# Patient Record
Sex: MALE | Race: WHITE | NOT HISPANIC OR LATINO | Employment: OTHER | URBAN - METROPOLITAN AREA
[De-identification: names, ages, dates, MRNs, and addresses within clinical notes are randomized per-mention and may not be internally consistent; named-entity substitution may affect disease eponyms.]

---

## 2017-03-21 RX ORDER — PROPAFENONE HYDROCHLORIDE 225 MG/1
CAPSULE, EXTENDED RELEASE ORAL
Qty: 90 CAPSULE | Refills: 2 | Status: ON HOLD | OUTPATIENT
Start: 2017-03-21 | End: 2020-01-16

## 2017-03-21 RX ORDER — SIMVASTATIN 40 MG
TABLET ORAL
Qty: 90 TABLET | Refills: 2 | Status: SHIPPED | OUTPATIENT
Start: 2017-03-21

## 2017-03-21 RX ORDER — RAMIPRIL 10 MG/1
CAPSULE ORAL
Qty: 90 CAPSULE | Refills: 2 | Status: SHIPPED | OUTPATIENT
Start: 2017-03-21

## 2017-04-26 ENCOUNTER — OFFICE VISIT (OUTPATIENT)
Dept: CARDIOLOGY | Facility: CLINIC | Age: 82
End: 2017-04-26

## 2017-04-26 ENCOUNTER — HOSPITAL ENCOUNTER (OUTPATIENT)
Dept: CARDIOLOGY | Facility: HOSPITAL | Age: 82
Discharge: HOME | End: 2017-04-26
Admitting: INTERNAL MEDICINE

## 2017-04-26 VITALS
WEIGHT: 161 LBS | SYSTOLIC BLOOD PRESSURE: 130 MMHG | BODY MASS INDEX: 23.05 KG/M2 | HEIGHT: 70 IN | HEART RATE: 52 BPM | DIASTOLIC BLOOD PRESSURE: 61 MMHG

## 2017-04-26 DIAGNOSIS — R06.09 DYSPNEA ON EXERTION: ICD-10-CM

## 2017-04-26 DIAGNOSIS — I42.9 CARDIOMYOPATHY (HCC): ICD-10-CM

## 2017-04-26 DIAGNOSIS — R09.89 CAROTID BRUIT, UNSPECIFIED LATERALITY: ICD-10-CM

## 2017-04-26 DIAGNOSIS — I10 HTN (HYPERTENSION), BENIGN: ICD-10-CM

## 2017-04-26 DIAGNOSIS — I25.10 CORONARY ARTERIOSCLEROSIS IN NATIVE ARTERY: ICD-10-CM

## 2017-04-26 DIAGNOSIS — R53.82 CHRONIC FATIGUE: ICD-10-CM

## 2017-04-26 DIAGNOSIS — R94.31 ABNORMAL ELECTROCARDIOGRAM: ICD-10-CM

## 2017-04-26 DIAGNOSIS — R00.1 SINUS BRADYCARDIA: ICD-10-CM

## 2017-04-26 DIAGNOSIS — I25.10 CAD IN NATIVE ARTERY: Primary | ICD-10-CM

## 2017-04-26 LAB
ALBUMIN SERPL-MCNC: 3.9 G/DL (ref 3.5–5.2)
ALBUMIN/GLOB SERPL: 1.2 G/DL
ALP SERPL-CCNC: 53 U/L (ref 39–117)
ALT SERPL W P-5'-P-CCNC: 11 U/L (ref 1–41)
ANION GAP SERPL CALCULATED.3IONS-SCNC: 12.3 MMOL/L
AST SERPL-CCNC: 18 U/L (ref 1–40)
BILIRUB SERPL-MCNC: 0.7 MG/DL (ref 0.1–1.2)
BUN BLD-MCNC: 27 MG/DL (ref 8–23)
BUN/CREAT SERPL: 25 (ref 7–25)
CALCIUM SPEC-SCNC: 9.9 MG/DL (ref 8.2–9.6)
CHLORIDE SERPL-SCNC: 105 MMOL/L (ref 98–107)
CHOLEST SERPL-MCNC: 127 MG/DL (ref 0–200)
CK SERPL-CCNC: 88 U/L (ref 20–200)
CO2 SERPL-SCNC: 23.7 MMOL/L (ref 22–29)
CREAT BLD-MCNC: 1.08 MG/DL (ref 0.76–1.27)
D DIMER PPP FEU-MCNC: <0.27 MCGFEU/ML (ref 0–0.49)
ERYTHROCYTE [SEDIMENTATION RATE] IN BLOOD: 15 MM/HR (ref 0–20)
GFR SERPL CREATININE-BSD FRML MDRD: 64 ML/MIN/1.73
GLOBULIN UR ELPH-MCNC: 3.3 GM/DL
GLUCOSE BLD-MCNC: 83 MG/DL (ref 65–99)
HDLC SERPL-MCNC: 61 MG/DL (ref 40–60)
LDLC SERPL CALC-MCNC: 42 MG/DL (ref 0–100)
LDLC/HDLC SERPL: 0.7 {RATIO}
MAGNESIUM SERPL-MCNC: 2.8 MG/DL (ref 1.6–2.4)
NT-PROBNP SERPL-MCNC: 591.5 PG/ML (ref 0–1800)
POTASSIUM BLD-SCNC: 5 MMOL/L (ref 3.5–5.2)
PROT SERPL-MCNC: 7.2 G/DL (ref 6–8.5)
SODIUM BLD-SCNC: 141 MMOL/L (ref 136–145)
T3FREE SERPL-MCNC: 2.69 PG/ML (ref 2–4.4)
TRIGL SERPL-MCNC: 118 MG/DL (ref 0–150)
VLDLC SERPL-MCNC: 23.6 MG/DL (ref 5–40)

## 2017-04-26 PROCEDURE — 82550 ASSAY OF CK (CPK): CPT | Performed by: INTERNAL MEDICINE

## 2017-04-26 PROCEDURE — 99214 OFFICE O/P EST MOD 30 MIN: CPT | Performed by: INTERNAL MEDICINE

## 2017-04-26 PROCEDURE — 83880 ASSAY OF NATRIURETIC PEPTIDE: CPT | Performed by: INTERNAL MEDICINE

## 2017-04-26 PROCEDURE — 85379 FIBRIN DEGRADATION QUANT: CPT | Performed by: INTERNAL MEDICINE

## 2017-04-26 PROCEDURE — 85652 RBC SED RATE AUTOMATED: CPT | Performed by: INTERNAL MEDICINE

## 2017-04-26 PROCEDURE — 93000 ELECTROCARDIOGRAM COMPLETE: CPT | Performed by: INTERNAL MEDICINE

## 2017-04-26 PROCEDURE — 84481 FREE ASSAY (FT-3): CPT | Performed by: INTERNAL MEDICINE

## 2017-04-26 PROCEDURE — 36415 COLL VENOUS BLD VENIPUNCTURE: CPT | Performed by: INTERNAL MEDICINE

## 2017-04-26 PROCEDURE — 83735 ASSAY OF MAGNESIUM: CPT | Performed by: INTERNAL MEDICINE

## 2017-04-26 PROCEDURE — 80053 COMPREHEN METABOLIC PANEL: CPT | Performed by: INTERNAL MEDICINE

## 2017-04-26 PROCEDURE — 80061 LIPID PANEL: CPT | Performed by: INTERNAL MEDICINE

## 2017-04-26 NOTE — PROGRESS NOTES
[facility]  Cardiology Office Visit Note        Subjective:     Encounter Date:2017      Patient ID: Tony Garrett   Age: 89 y.o.  Sex: male  :  1926  MRN: [id number]             Date of Office Visit: 2017  Encounter Provider: Seven Cabello MD  Place of Service: [facility]     .    Chief Complaint:  History of Present Illness    The following portions of the patient's history were reviewed and updated as appropriate: allergies, current medications, past family history, past medical history, past social history, past surgical history and problem list.    Review of Systems   Constitution: Positive for weakness and malaise/fatigue. Negative for chills, fever and weight gain.   HENT: Negative for congestion, headaches, hearing loss and sore throat.    Eyes: Negative for blurred vision and double vision.   Cardiovascular: Positive for dyspnea on exertion. Negative for chest pain, claudication, cyanosis, irregular heartbeat, leg swelling, near-syncope, orthopnea, palpitations, paroxysmal nocturnal dyspnea and syncope.   Respiratory: Negative for cough, shortness of breath, snoring and wheezing.    Endocrine: Negative for cold intolerance.   Hematologic/Lymphatic: Negative for adenopathy. Does not bruise/bleed easily.   Skin: Negative for rash.   Musculoskeletal: Negative for back pain.   Gastrointestinal: Positive for nausea and vomiting. Negative for abdominal pain, change in bowel habit, constipation and diarrhea.   Genitourinary: Negative for dysuria, frequency, hematuria and hesitancy.   Neurological: Positive for numbness. Negative for disturbances in coordination, excessive daytime sleepiness, dizziness, focal weakness, light-headedness and loss of balance.        Feet   Psychiatric/Behavioral: Negative for depression and memory loss. The patient is not nervous/anxious.    Allergic/Immunologic: Negative for hives.         ECG 12  Lead  Date/Time: 4/26/2017 9:11 AM  Performed by: MICHELLE WELCH JR  Authorized by: MICHELLE WELCH JR   Comparison: compared with previous ECG   Similar to previous ECG  Rhythm: sinus bradycardia  BPM: 50  T flattening: all  Clinical impression: abnormal ECG and low voltage                       HPI       The patient is a 9-year-old white male, with history of bypass in 2003, history of paroxysmal atrial fibrillation on Rythmol, hypertension, hyperlipidemia presents for cardiac follow-up.  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  Since then, he denies chest pain.  No PND, orthopnea.  He has mild ankle edema, dependent, mild, chronic.  No palpitations dizziness or syncope.  No change in weight.    Cardiac risk factors: Distant history of tobacco abuse.  Positive for hyperlipidemia and hypertension.  No family history of early CAD.  No diabetes.          No past medical history on file.    No past surgical history on file.    Social History     Social History   • Marital status:      Spouse name: N/A   • Number of children: N/A   • Years of education: N/A     Occupational History   • Not on file.     Social History Main Topics   • Smoking status: Not on file   • Smokeless tobacco: Not on file   • Alcohol use Not on file   • Drug use: Not on file   • Sexual activity: Not on file     Other Topics Concern   • Not on file     Social History Narrative       No family history on file.        Scheduled Meds:  Current Outpatient Prescriptions on File Prior to Visit   Medication Sig Dispense Refill   • amLODIPine (NORVASC) 5 MG tablet TAKE 1 TABLET DAILY 90 tablet 3   • aspirin 325 MG tablet Take  by mouth daily.     • propafenone (RHTHYMOL) 150 MG tablet TAKE 1 TABLET AT NIGHT 90 tablet 2   • propafenone SR (RYTHMOL SR) 225 MG 12 hr capsule TAKE 1 CAPSULE IN THE MORNING 90 capsule 2   • ramipril (ALTACE) 10 MG capsule TAKE 1  "CAPSULE DAILY 90 capsule 2   • simvastatin (ZOCOR) 40 MG tablet TAKE 1 TABLET AT BEDTIME 90 tablet 2   • zolpidem (AMBIEN) 10 MG tablet        No current facility-administered medications on file prior to visit.        /61  Pulse 52  Ht 70\" (177.8 cm)  Wt 161 lb (73 kg)  BMI 23.1 kg/m2    Objective:     Physical Exam   Constitutional: He is oriented to person, place, and time. He appears well-developed and well-nourished. No distress.   HENT:   Head: Normocephalic and atraumatic.   Right Ear: External ear normal.   Left Ear: External ear normal.   Mouth/Throat: Oropharynx is clear and moist. No oropharyngeal exudate.   Eyes: Conjunctivae and EOM are normal. Pupils are equal, round, and reactive to light. No scleral icterus.   Neck: Normal range of motion. Neck supple. No JVD present. No tracheal deviation present. No thyromegaly present.   Cardiovascular: Normal rate, regular rhythm, S1 normal, S2 normal and intact distal pulses.  PMI is not displaced.  Exam reveals no gallop, no distant heart sounds, no friction rub and no decreased pulses.    Murmur heard.  2/6 systolic murmur at the left sternal border.   Pulmonary/Chest: Effort normal and breath sounds normal. No accessory muscle usage. No respiratory distress. He has no wheezes. He has no rales. He exhibits no tenderness.   Abdominal: Soft. Bowel sounds are normal. He exhibits no distension and no mass. There is no tenderness. There is no rebound and no guarding.   Musculoskeletal: Normal range of motion. He exhibits no edema, tenderness or deformity.   Lymphadenopathy:     He has no cervical adenopathy.   Neurological: He is alert and oriented to person, place, and time. He has normal reflexes. No cranial nerve deficit. Coordination normal.   Skin: Skin is dry. No rash noted. He is not diaphoretic. No erythema. No pallor.   Psychiatric: He has a normal mood and affect.             Lab Review:               Lab Review:         Lab Review     Lab " Results   Component Value Date    CHOL 118 10/24/2016     Lab Results   Component Value Date    HDL 62 (H) 10/24/2016    HDL 61 (H) 11/04/2015    HDL 53 03/11/2015     Lab Results   Component Value Date    LDL 42 11/04/2015    LDL 43 03/11/2015    LDL 52 09/09/2014     Lab Results   Component Value Date    TRIG 86 10/24/2016    TRIG 88 11/04/2015    TRIG 126 03/11/2015     No components found for: CHOLHDL  Lab Results   Component Value Date    GLUCOSE 71 10/24/2016    BUN 27 (H) 10/24/2016    CREATININE 1.18 10/24/2016    EGFRIFNONA 58 (L) 10/24/2016    BCR 22.9 10/24/2016    CO2 21.1 (L) 10/24/2016    CALCIUM 9.5 10/24/2016    ALBUMIN 4.00 10/24/2016    LABIL2 1.7 10/24/2016    AST 12 10/24/2016    ALT 9 10/24/2016     Lab Results   Component Value Date    GLUCOSE 71 10/24/2016    CALCIUM 9.5 10/24/2016     10/24/2016    K 4.4 10/24/2016    CO2 21.1 (L) 10/24/2016     10/24/2016    BUN 27 (H) 10/24/2016    CREATININE 1.18 10/24/2016    EGFRIFNONA 58 (L) 10/24/2016    BCR 22.9 10/24/2016    ANIONGAP 13.9 10/24/2016     Lab Results   Component Value Date    WBC 5.14 10/24/2016    HGB 12.1 (L) 10/24/2016    HCT 36.9 (L) 10/24/2016    MCV 95.1 10/24/2016     (L) 10/24/2016     Lab Results   Component Value Date    DDIMER <150 04/09/2014     Lab Results   Component Value Date    TSH 3.380 10/24/2016     Lab Results   Component Value Date    CKTOTAL 71 10/24/2016     No results found for: DIGOXIN  Lab Results   Component Value Date    CKTOTAL 71 10/24/2016     No results found for: INR, PROTIME  CrCl cannot be calculated (Patient has no serum creatinine result on file.).    Assessment:          Diagnosis Plan   1. CAD in native artery  ECG 12 Lead    CBC & Differential    CK    Comprehensive Metabolic Panel    D-dimer, Quantitative    Lipid Panel    Magnesium    proBNP    T3, Free    TSH    T4, free    Sedimentation Rate    Testosterone, Free, Total   2. HTN (hypertension), benign  ECG 12 Lead   3.  Cardiomyopathy  CBC & Differential    CK    Comprehensive Metabolic Panel    D-dimer, Quantitative    Lipid Panel    Magnesium    proBNP    T3, Free    TSH    T4, free    Sedimentation Rate    Testosterone, Free, Total   4. Carotid bruit, unspecified laterality     5. Coronary arteriosclerosis in native artery     6. Sinus bradycardia     7. Dyspnea on exertion  CBC & Differential    CK    Comprehensive Metabolic Panel    D-dimer, Quantitative    Lipid Panel    Magnesium    proBNP    T3, Free    TSH    T4, free    Sedimentation Rate    Testosterone, Free, Total   8. Abnormal electrocardiogram  CBC & Differential    CK    Comprehensive Metabolic Panel    D-dimer, Quantitative    Lipid Panel    Magnesium    proBNP    T3, Free    TSH    T4, free    Sedimentation Rate    Testosterone, Free, Total   9. Chronic fatigue  CBC & Differential    CK    Comprehensive Metabolic Panel    D-dimer, Quantitative    Lipid Panel    Magnesium    proBNP    T3, Free    TSH    T4, free    Sedimentation Rate    Testosterone, Free, Total          Assessment and Plan:    Tony was seen today for coronary artery disease and hypertension.    Diagnoses and all orders for this visit:    CAD in native artery  -     ECG 12 Lead  -     CBC & Differential  -     CK  -     Comprehensive Metabolic Panel  -     D-dimer, Quantitative  -     Lipid Panel  -     Magnesium  -     proBNP  -     T3, Free  -     TSH  -     T4, free  -     Sedimentation Rate  -     Testosterone, Free, Total    HTN (hypertension), benign  -     ECG 12 Lead    Cardiomyopathy  -     CBC & Differential  -     CK  -     Comprehensive Metabolic Panel  -     D-dimer, Quantitative  -     Lipid Panel  -     Magnesium  -     proBNP  -     T3, Free  -     TSH  -     T4, free  -     Sedimentation Rate  -     Testosterone, Free, Total    Carotid bruit, unspecified laterality    Coronary arteriosclerosis in native artery    Sinus bradycardia    Dyspnea on exertion  -     CBC &  Differential  -     CK  -     Comprehensive Metabolic Panel  -     D-dimer, Quantitative  -     Lipid Panel  -     Magnesium  -     proBNP  -     T3, Free  -     TSH  -     T4, free  -     Sedimentation Rate  -     Testosterone, Free, Total    Abnormal electrocardiogram  -     CBC & Differential  -     CK  -     Comprehensive Metabolic Panel  -     D-dimer, Quantitative  -     Lipid Panel  -     Magnesium  -     proBNP  -     T3, Free  -     TSH  -     T4, free  -     Sedimentation Rate  -     Testosterone, Free, Total    Chronic fatigue  -     CBC & Differential  -     CK  -     Comprehensive Metabolic Panel  -     D-dimer, Quantitative  -     Lipid Panel  -     Magnesium  -     proBNP  -     T3, Free  -     TSH  -     T4, free  -     Sedimentation Rate  -     Testosterone, Free, Total    The patient is complains of easy fatigability and generalized weakness.  I will have him undergo testosterone test.  I'll also get a CBC, potassium level, magnesium level etc. to evaluate his fatigue.        A total of 25 minutes was spent in the care of this patient, including at least 13 minutes face-to-face with the patient.    I not only counseled the patient today on the significant factors noted in the assessment and plan, but I also recommended that the patient reduce salt and saturated animal fat intake in diet, as well as to perform scheduled exercise on a regular basis.      Plan:                  04/26/2017  12:00 PM  MD Seven Hugo MD  4/26/2017, 12:00 PM    EMR Dragon/Transcription disclaimer:   Much of this encounter note is an electronic transcription/translation of spoken language to printed text. The electronic translation of spoken language may permit erroneous, or at times, nonsensical words or phrases to be inadvertently transcribed; Although I have reviewed the note for such errors, some may still exist.

## 2017-09-13 ENCOUNTER — TRANSCRIBE ORDERS (OUTPATIENT)
Dept: CARDIOLOGY | Facility: CLINIC | Age: 82
End: 2017-09-13

## 2017-09-13 DIAGNOSIS — R09.89 CAROTID BRUIT, UNSPECIFIED LATERALITY: Primary | ICD-10-CM

## 2017-09-15 ENCOUNTER — HOSPITAL ENCOUNTER (OUTPATIENT)
Dept: ULTRASOUND IMAGING | Facility: HOSPITAL | Age: 82
Discharge: HOME | End: 2017-09-15
Attending: INTERNAL MEDICINE | Admitting: INTERNAL MEDICINE

## 2017-09-15 ENCOUNTER — TRANSCRIBE ORDERS (OUTPATIENT)
Dept: ADMINISTRATIVE | Facility: HOSPITAL | Age: 82
End: 2017-09-15

## 2017-09-15 ENCOUNTER — LAB (OUTPATIENT)
Dept: LAB | Facility: HOSPITAL | Age: 82
End: 2017-09-15
Attending: INTERNAL MEDICINE

## 2017-09-15 DIAGNOSIS — R09.89 CAROTID BRUIT, UNSPECIFIED LATERALITY: ICD-10-CM

## 2017-09-15 DIAGNOSIS — I48.91 ATRIAL FIBRILLATION, UNSPECIFIED TYPE (HCC): ICD-10-CM

## 2017-09-15 DIAGNOSIS — I48.91 ATRIAL FIBRILLATION, UNSPECIFIED TYPE (HCC): Primary | ICD-10-CM

## 2017-09-15 DIAGNOSIS — R06.02 SHORTNESS OF BREATH: ICD-10-CM

## 2017-09-15 LAB
ALBUMIN SERPL-MCNC: 3.7 G/DL (ref 3.5–5.2)
ALBUMIN/GLOB SERPL: 1.4 G/DL
ALP SERPL-CCNC: 49 U/L (ref 40–129)
ALT SERPL W P-5'-P-CCNC: 8 U/L (ref 5–41)
ANION GAP SERPL CALCULATED.3IONS-SCNC: 10.4 MMOL/L
AST SERPL-CCNC: 14 U/L (ref 5–40)
BASOPHILS # BLD AUTO: 0.02 10*3/MM3 (ref 0–0.2)
BASOPHILS NFR BLD AUTO: 0.5 % (ref 0–2)
BILIRUB SERPL-MCNC: 0.8 MG/DL (ref 0.2–1.2)
BUN BLD-MCNC: 23 MG/DL (ref 8–23)
BUN/CREAT SERPL: 20.9 (ref 7–25)
CALCIUM SPEC-SCNC: 9.2 MG/DL (ref 8.2–9.6)
CHLORIDE SERPL-SCNC: 106 MMOL/L (ref 98–107)
CK SERPL-CCNC: 142 U/L (ref 36–170)
CO2 SERPL-SCNC: 23.6 MMOL/L (ref 22–29)
CREAT BLD-MCNC: 1.1 MG/DL (ref 0.76–1.27)
DEPRECATED RDW RBC AUTO: 45.2 FL (ref 37–54)
EOSINOPHIL # BLD AUTO: 0.16 10*3/MM3 (ref 0.1–0.3)
EOSINOPHIL NFR BLD AUTO: 4.1 % (ref 0–4)
ERYTHROCYTE [DISTWIDTH] IN BLOOD BY AUTOMATED COUNT: 13.3 % (ref 11.5–14.5)
GFR SERPL CREATININE-BSD FRML MDRD: 63 ML/MIN/1.73
GLOBULIN UR ELPH-MCNC: 2.7 GM/DL
GLUCOSE BLD-MCNC: 94 MG/DL (ref 65–99)
HCT VFR BLD AUTO: 36.2 % (ref 42–52)
HGB BLD-MCNC: 11.9 G/DL (ref 14–18)
IMM GRANULOCYTES # BLD: 0 10*3/MM3 (ref 0–0.03)
IMM GRANULOCYTES NFR BLD: 0 % (ref 0–0.5)
LYMPHOCYTES # BLD AUTO: 0.79 10*3/MM3 (ref 0.6–4.8)
LYMPHOCYTES NFR BLD AUTO: 20.2 % (ref 20–45)
MCH RBC QN AUTO: 30.4 PG (ref 27–31)
MCHC RBC AUTO-ENTMCNC: 32.9 G/DL (ref 31–37)
MCV RBC AUTO: 92.3 FL (ref 80–94)
MONOCYTES # BLD AUTO: 0.28 10*3/MM3 (ref 0–1)
MONOCYTES NFR BLD AUTO: 7.2 % (ref 3–8)
NEUTROPHILS # BLD AUTO: 2.66 10*3/MM3 (ref 1.5–8.3)
NEUTROPHILS NFR BLD AUTO: 68 % (ref 45–70)
NRBC BLD MANUAL-RTO: 0 /100 WBC (ref 0–0)
NT-PROBNP SERPL-MCNC: 964.8 PG/ML (ref 5–450)
PLATELET # BLD AUTO: 134 10*3/MM3 (ref 140–500)
PMV BLD AUTO: 9.4 FL (ref 7.4–10.4)
POTASSIUM BLD-SCNC: 4.5 MMOL/L (ref 3.5–5.2)
PROT SERPL-MCNC: 6.4 G/DL (ref 6–8.5)
RBC # BLD AUTO: 3.92 10*6/MM3 (ref 4.7–6.1)
SODIUM BLD-SCNC: 140 MMOL/L (ref 136–145)
T3FREE SERPL-MCNC: 2.62 PG/ML (ref 2–4.4)
T4 FREE SERPL-MCNC: 1.29 NG/DL (ref 0.93–1.7)
TROPONIN T SERPL-MCNC: <0.01 NG/ML (ref 0–0.03)
TSH SERPL DL<=0.05 MIU/L-ACNC: 2.33 MIU/ML (ref 0.27–4.2)
WBC NRBC COR # BLD: 3.91 10*3/MM3 (ref 4.8–10.8)

## 2017-09-15 PROCEDURE — 80053 COMPREHEN METABOLIC PANEL: CPT

## 2017-09-15 PROCEDURE — 84443 ASSAY THYROID STIM HORMONE: CPT

## 2017-09-15 PROCEDURE — 93880 EXTRACRANIAL BILAT STUDY: CPT

## 2017-09-15 PROCEDURE — 84484 ASSAY OF TROPONIN QUANT: CPT

## 2017-09-15 PROCEDURE — 36415 COLL VENOUS BLD VENIPUNCTURE: CPT

## 2017-09-15 PROCEDURE — 85025 COMPLETE CBC W/AUTO DIFF WBC: CPT

## 2017-09-15 PROCEDURE — 84439 ASSAY OF FREE THYROXINE: CPT

## 2017-09-15 PROCEDURE — 83880 ASSAY OF NATRIURETIC PEPTIDE: CPT

## 2017-09-15 PROCEDURE — 82550 ASSAY OF CK (CPK): CPT

## 2017-09-15 PROCEDURE — 84481 FREE ASSAY (FT-3): CPT

## 2017-09-18 RX ORDER — PROPAFENONE HYDROCHLORIDE 150 MG/1
TABLET, COATED ORAL
Qty: 90 TABLET | Refills: 2 | OUTPATIENT
Start: 2017-09-18

## 2020-01-16 ENCOUNTER — HOSPITAL ENCOUNTER (INPATIENT)
Facility: HOSPITAL | Age: 85
LOS: 9 days | Discharge: HOME | End: 2020-01-25
Attending: FAMILY MEDICINE | Admitting: FAMILY MEDICINE

## 2020-01-16 DIAGNOSIS — F51.04 CHRONIC INSOMNIA: Primary | ICD-10-CM

## 2020-01-16 PROBLEM — Z51.89 ENCOUNTER FOR REHABILITATION: Status: ACTIVE | Noted: 2020-01-16

## 2020-01-16 PROCEDURE — 87081 CULTURE SCREEN ONLY: CPT | Performed by: FAMILY MEDICINE

## 2020-01-16 RX ORDER — METOPROLOL SUCCINATE 25 MG/1
25 TABLET, EXTENDED RELEASE ORAL 2 TIMES DAILY
COMMUNITY

## 2020-01-16 RX ORDER — AMLODIPINE BESYLATE 5 MG/1
5 TABLET ORAL DAILY
Status: DISCONTINUED | OUTPATIENT
Start: 2020-01-16 | End: 2020-01-25 | Stop reason: HOSPADM

## 2020-01-16 RX ORDER — ACETAMINOPHEN 325 MG/1
650 TABLET ORAL EVERY 4 HOURS PRN
Status: DISCONTINUED | OUTPATIENT
Start: 2020-01-16 | End: 2020-01-25 | Stop reason: HOSPADM

## 2020-01-16 RX ORDER — BISACODYL 5 MG/1
5 TABLET, DELAYED RELEASE ORAL DAILY PRN
Status: DISCONTINUED | OUTPATIENT
Start: 2020-01-16 | End: 2020-01-25 | Stop reason: HOSPADM

## 2020-01-16 RX ORDER — METOPROLOL SUCCINATE 25 MG/1
25 TABLET, EXTENDED RELEASE ORAL 2 TIMES DAILY
Status: DISCONTINUED | OUTPATIENT
Start: 2020-01-16 | End: 2020-01-25 | Stop reason: HOSPADM

## 2020-01-16 RX ORDER — DOCUSATE SODIUM 100 MG/1
100 CAPSULE, LIQUID FILLED ORAL 2 TIMES DAILY PRN
Status: DISCONTINUED | OUTPATIENT
Start: 2020-01-16 | End: 2020-01-25 | Stop reason: HOSPADM

## 2020-01-16 RX ORDER — RAMIPRIL 2.5 MG/1
10 CAPSULE ORAL DAILY
Status: DISCONTINUED | OUTPATIENT
Start: 2020-01-16 | End: 2020-01-25 | Stop reason: HOSPADM

## 2020-01-16 RX ORDER — ONDANSETRON 2 MG/ML
4 INJECTION INTRAMUSCULAR; INTRAVENOUS EVERY 6 HOURS PRN
Status: DISCONTINUED | OUTPATIENT
Start: 2020-01-16 | End: 2020-01-25 | Stop reason: HOSPADM

## 2020-01-16 RX ORDER — SIMVASTATIN 40 MG
40 TABLET ORAL DAILY
Status: DISCONTINUED | OUTPATIENT
Start: 2020-01-16 | End: 2020-01-25 | Stop reason: HOSPADM

## 2020-01-16 RX ORDER — ZOLPIDEM TARTRATE 5 MG/1
5 TABLET ORAL NIGHTLY
Status: DISCONTINUED | OUTPATIENT
Start: 2020-01-16 | End: 2020-01-25 | Stop reason: HOSPADM

## 2020-01-16 RX ORDER — AMOXICILLIN 250 MG
2 CAPSULE ORAL 2 TIMES DAILY PRN
Status: DISCONTINUED | OUTPATIENT
Start: 2020-01-16 | End: 2020-01-25 | Stop reason: HOSPADM

## 2020-01-16 RX ORDER — ONDANSETRON 4 MG/1
4 TABLET, FILM COATED ORAL EVERY 6 HOURS PRN
Status: DISCONTINUED | OUTPATIENT
Start: 2020-01-16 | End: 2020-01-25 | Stop reason: HOSPADM

## 2020-01-16 RX ORDER — ZOLPIDEM TARTRATE 5 MG/1
10 TABLET ORAL NIGHTLY
Status: DISCONTINUED | OUTPATIENT
Start: 2020-01-16 | End: 2020-01-16

## 2020-01-16 RX ADMIN — METOPROLOL SUCCINATE 25 MG: 25 TABLET, EXTENDED RELEASE ORAL at 22:40

## 2020-01-16 RX ADMIN — DOCUSATE SODIUM 100 MG: 100 CAPSULE, LIQUID FILLED ORAL at 22:40

## 2020-01-16 RX ADMIN — TUBERCULIN PURIFIED PROTEIN DERIVATIVE 5 UNITS: 5 INJECTION INTRADERMAL at 23:08

## 2020-01-17 LAB — MRSA SPEC QL CULT: NORMAL

## 2020-01-17 PROCEDURE — 97161 PT EVAL LOW COMPLEX 20 MIN: CPT

## 2020-01-17 PROCEDURE — 97535 SELF CARE MNGMENT TRAINING: CPT

## 2020-01-17 PROCEDURE — 97165 OT EVAL LOW COMPLEX 30 MIN: CPT

## 2020-01-17 PROCEDURE — 97110 THERAPEUTIC EXERCISES: CPT

## 2020-01-17 RX ADMIN — AMLODIPINE BESYLATE 5 MG: 5 TABLET ORAL at 09:42

## 2020-01-17 RX ADMIN — RAMIPRIL 10 MG: 2.5 CAPSULE ORAL at 09:42

## 2020-01-17 RX ADMIN — METOPROLOL SUCCINATE 25 MG: 25 TABLET, EXTENDED RELEASE ORAL at 09:42

## 2020-01-17 RX ADMIN — ZOLPIDEM TARTRATE 5 MG: 5 TABLET ORAL at 00:09

## 2020-01-17 RX ADMIN — WHITE PETROLATUM 41 % TOPICAL OINTMENT: OINTMENT at 21:42

## 2020-01-17 RX ADMIN — METOPROLOL SUCCINATE 25 MG: 25 TABLET, EXTENDED RELEASE ORAL at 21:42

## 2020-01-17 RX ADMIN — ZOLPIDEM TARTRATE 5 MG: 5 TABLET ORAL at 21:45

## 2020-01-17 NOTE — THERAPY EVALUATION
SNF - Physical Therapy Initial Evaluation   [facility]     Patient Name: Tony Garrett  : 1926  MRN: [id number]  Today's Date: 2020   Onset of Illness/Injury or Date of Surgery: 20(Admitted to SNF on 20)  Date of Referral to PT: 20  Referring Physician: Dr. Corrales       Admit Date: 2020    Visit Dx: No diagnosis found.  Patient Active Problem List   Diagnosis   • Abnormal electrocardiogram   • Coronary arteriosclerosis in native artery   • Cardiomyopathy (CMS/HCC)   • Carotid bruit   • Dyspnea on exertion   • Sinus bradycardia   • Fatigue   • Encounter for rehabilitation     Past Medical History:   Diagnosis Date   • Atrial fibrillation, chronic    • Chronic kidney disease (CKD), stage III (moderate) (CMS/HCC)    • HTN (hypertension)    • Hyperlipidemia    • Kidney stones      Past Surgical History:   Procedure Laterality Date   • CYSTOSCOPY BLADDER STONE LITHOTRIPSY          PT ASSESSMENT (last 12 hours)      Physical Therapy Evaluation     Row Name 20 0900          PT Evaluation Time/Intention    Subjective Information  no complaints  -BP     Document Type  evaluation  -BP     Mode of Treatment  physical therapy  -BP     Patient Effort  adequate  -BP     Symptoms Noted During/After Treatment  none  -BP     Row Name 20 0900          General Information    Patient Profile Reviewed?  yes  -BP     Onset of Illness/Injury or Date of Surgery  20 Admitted to SNF on 20  -BP     Referring Physician  Dr. Corrales   -BP     Patient Observations  alert;cooperative;agree to therapy  -BP     Patient/Family Observations  Patient supine in bed with HOB elevated. Agreeable to PT evaluation.   -BP     Prior Level of Function  independent:;all household mobility;community mobility;gait;transfer;bed mobility;ADL's per patient report. Without an assistive device   -BP     Equipment Currently Used at Home  shower chair;raised toilet;grab bar owns a straight cane, does  "not use   -BP     Pertinent History of Current Functional Problem  Patient presented to hospital with complaints of flank pain. Patient underwent a uteroscopy with stone extraction and stent insertion.  Patient reports at baseline he does not use a device and is independent with all mobility and ADL's.  Patient reports at baseline he drags his L toe at times. He states \"it gets caught on the floor sometimes.\"   -BP     Existing Precautions/Restrictions  fall  -BP     Risks Reviewed  patient:;LOB  -BP     Benefits Reviewed  patient:;improve function;increase independence;increase strength  -BP     Barriers to Rehab  none identified  -BP     Row Name 01/17/20 0900          Relationship/Environment    Lives With  spouse  -BP     Row Name 01/17/20 0900          Resource/Environmental Concerns    Current Living Arrangements  home/apartment/condo  -BP     Row Name 01/17/20 0900          Living Environment    Living Arrangements  house  -BP     Home Accessibility  stairs to enter home  -BP     Living Environment Comment  Patient with ranch style home with walk out basement.   -BP     Row Name 01/17/20 0900          Home Main Entrance    Number of Stairs, Main Entrance  two  -BP     Stairs Comment, Main Entrance  one handrail, does not specify which side   -BP     Row Name 01/17/20 0900          Cognitive Assessment/Interventions    Additional Documentation  Cognitive Assessment/Intervention (Group)  -BP     Row Name 01/17/20 0900          Cognitive Assessment/Intervention- PT/OT    Orientation Status (Cognition)  oriented x 4  -BP     Follows Commands (Cognition)  WFL  -BP     Personal Safety Interventions  gait belt;nonskid shoes/slippers when out of bed  -BP     Row Name 01/17/20 0900          Safety Issues, Functional Mobility    Safety Issues Affecting Function (Mobility)  positioning of assistive device  -BP     Row Name 01/17/20 0900          Bed Mobility Assessment/Treatment    Bed Mobility Assessment/Treatment  " supine-sit  -BP     Supine-Sit [place] (Bed Mobility)  -- SBA   -BP     Assistive Device (Bed Mobility)  bed rails;head of bed elevated  -BP     Comment (Bed Mobility)  Patient requires extended time to complete transfer   -BP     Row Name 01/17/20 0900          Transfer Assessment/Treatment    Transfer Assessment/Treatment  sit-stand transfer;stand-sit transfer  -BP     Comment (Transfers)  Verbal cues for hand placement required.   -BP     Sit-Stand [place] (Transfers)  verbal cues;minimum assist (75% patient effort)  -BP     Stand-Sit [place] (Transfers)  contact guard;verbal cues  -BP     Row Name 01/17/20 0900          Sit-Stand Transfer    Assistive Device (Sit-Stand Transfers)  walker, front-wheeled  -BP     Row Name 01/17/20 0900          Stand-Sit Transfer    Assistive Device (Stand-Sit Transfers)  walker, front-wheeled  -BP     Row Name 01/17/20 0900          Gait/Stairs Assessment/Training    [place] Level (Gait)  contact guard;verbal cues  -     Assistive Device (Gait)  walker, front-wheeled  -BP     Distance in Feet (Gait)  20  -BP     Pattern (Gait)  swing-through  -BP     Deviations/Abnormal Patterns (Gait)  gait speed decreased;stride length decreased  -BP     Bilateral Gait Deviations  forward flexed posture  -BP     Left Sided Gait Deviations  foot drop/toe drag  -BP     Comment (Gait/Stairs)  Patient requires intermittent cues for positioning of device but does not require assist. No loss of balance noted.   -     Row Name 01/17/20 0900          General ROM    GENERAL ROM COMMENTS  B LE AROM WFL. L ankle DF limited greater than right DF.   -     Row Name 01/17/20 0900          MMT (Manual Muscle Testing)    General MMT Comments  B LE gross MMT 4+/5   -     Row Name 01/17/20 0900          Motor Assessment/Intervention    Additional Documentation  Therapeutic Exercise (Group)  -     Row Name 01/17/20 0900          Therapeutic Exercise    Therapeutic Exercise  seated,  lower extremities;supine, lower extremities  -BP     Additional Documentation  Therapeutic Exercise (Row)  -BP     Row Name 01/17/20 0900          Lower Extremity Seated Therapeutic Exercise    Performed, Seated Lower Extremity (Therapeutic Exercise)  hip flexion/extension;LAQ (long arc quad), knee extension  -BP     Comment, Seated Lower Extremity (Therapeutic Exercise)  Provided and reviewed written handout   -BP     Row Name 01/17/20 0900          Lower Extremity Supine Therapeutic Exercise    Performed, Supine Lower Extremity (Therapeutic Exercise)  hip abduction/adduction;SLR (straight leg raise);gluteal sets;ankle pumps;heel slides  -BP     Comment, Supine Lower Extremity (Therapeutic Exercise)  Provided and reviewed LE HEP   -BP     Row Name 01/17/20 0900          Sensory Assessment/Intervention    Sensory General Assessment  -- Patient denies numbness and tingling B LE's   -BP     Row Name 01/17/20 0900          Pain Assessment    Additional Documentation  Pain Scale: Numbers Pre/Post-Treatment (Group)  -BP     Row Name 01/17/20 0900          Pain Scale: Numbers Pre/Post-Treatment    Pain Scale: Numbers, Pretreatment  0/10 - no pain  -BP     Pain Scale: Numbers, Post-Treatment  0/10 - no pain  -BP     Pre/Post Treatment Pain Comment  Patient reports pain when urinating but denies pain at rest or with mobility  -BP     Row Name             Wound 01/16/20 2000 Right posterior elbow Abrasion    Wound - Properties Group Date first assessed: 01/16/20  -RS Time first assessed: 2000  -RS Side: Right  -RS Orientation: posterior  -RS Location: elbow  -RS Primary Wound Type: Abrasion  -RS    Row Name 01/17/20 0900          Plan of Care Review    Plan of Care Reviewed With  patient  -BP     Outcome Summary  PT Evaluation Complete: Patient performs supine to sit transfer with SBA, sit to/from stand transfers with min A, and gait x 20 feet with CGA with use of FWW. Patient demonstrates no loss of balance noted. Patient  would benefit from skilled PT services to address deficits in functional mobility and LE strength. Plan to see patient daily 5x/week x 1 week. Recommend home health PT at discharge. Will continue to assess equipment needs.   -BP     Row Name 01/17/20 0900          Physical Therapy Clinical Impression    Date of Referral to PT  01/16/20  -BP     PT Diagnosis (PT Clinical Impression)  Decreased functional mobility   -BP     Criteria for Skilled Interventions Met (PT Clinical Impression)  yes;treatment indicated  -BP     Rehab Potential (PT Clinical Summary)  good, to achieve stated therapy goals  -BP     Predicted Duration of Therapy (PT)  1 week   -BP     Care Plan Review (PT)  evaluation/treatment results reviewed;care plan/treatment goals reviewed;risks/benefits reviewed  -BP     Row Name 01/17/20 0900          Physical Therapy Goals    Bed Mobility Goal Selection (PT)  bed mobility, PT goal 1  -BP     Transfer Goal Selection (PT)  transfer, PT goal 1  -BP     Gait Training Goal Selection (PT)  gait training, PT goal 1  -BP     Stairs Goal Selection (PT)  stairs, PT goal 1  -BP     Additional Documentation  Stairs Goal Selection (PT) (Row)  -BP     Row Name 01/17/20 0900          Bed Mobility Goal 1 (PT)    Activity/Assistive Device (Bed Mobility Goal 1, PT)  bed mobility activities, all  -BP     [place] Level/Cues Needed (Bed Mobility Goal 1, PT)  supervision required  -BP     Time Frame (Bed Mobility Goal 1, PT)  1 week  -BP     Progress/Outcomes (Bed Mobility Goal 1, PT)  goal ongoing  -BP     Row Name 01/17/20 0900          Transfer Goal 1 (PT)    Activity/Assistive Device (Transfer Goal 1, PT)  sit-to-stand/stand-to-sit with appropriate assistive device   -BP     [place] Level/Cues Needed (Transfer Goal 1, PT)  supervision required  -BP     Time Frame (Transfer Goal 1, PT)  1 week  -BP     Progress/Outcome (Transfer Goal 1, PT)  goal ongoing  -BP     Row Name 01/17/20 0900          Gait Training Goal  1 (PT)    Activity/Assistive Device (Gait Training Goal 1, PT)  gait (walking locomotion);walker, rolling  -BP     [place] Level (Gait Training Goal 1, PT)  supervision required  -BP     Distance (Gait Goal 1, PT)  100  -BP     Time Frame (Gait Training Goal 1, PT)  1 week  -BP     Progress/Outcome (Gait Training Goal 1, PT)  goal ongoing  -BP     Row Name 01/17/20 0900          Stairs Goal 1 (PT)    Activity/Assistive Device (Stairs Goal 1, PT)  ascending stairs;descending stairs  -BP     [place] Level/Cues Needed (Stairs Goal 1, PT)  contact guard assist  -BP     Number of Stairs (Stairs Goal 1, PT)  2 with one handrail   -BP     Time Frame (Stairs Goal 1, PT)  1 week  -BP     Progress/Outcome (Stairs Goal 1, PT)  goal ongoing  -BP     Row Name 01/17/20 0900          Positioning and Restraints    Pre-Treatment Position  in bed  -BP     Post Treatment Position  chair  -BP     In Chair  notified nsg;reclined;call light within reach;encouraged to call for assist  -BP       User Key  (r) = Recorded By, (t) = Taken By, (c) = Cosigned By    Initials Name Provider Type    BP Jonnie Clark, PT Physical Therapist    RS Sindy Henson, RN Registered Nurse        Physical Therapy Education                 Title: PT OT SLP Therapies (Done)     Topic: Physical Therapy (Done)     Point: Mobility training (Done)     Description:   Instruct learner(s) on safety and technique for assisting patient out of bed, chair or wheelchair.  Instruct in the proper use of assistive devices, such as walker, crutches, cane or brace.              Patient Friendly Description:   It's important to get you on your feet again, but we need to do so in a way that is safe for you. Falling has serious consequences, and your personal safety is the most important thing of all.        When it's time to get out of bed, one of us or a family member will sit next to you on the bed to give you support.     If your doctor or nurse tells you to  use a walker, crutches, a cane, or a brace, be sure you use it every time you get out of bed, even if you think you don't need it.    Learning Progress Summary           Patient Acceptance, E,TB, VU by  at 1/17/2020 1126                   Point: Home exercise program (Done)     Description:   Instruct learner(s) on appropriate technique for monitoring, assisting and/or progressing patient with therapeutic exercises and activities.              Learning Progress Summary           Patient Acceptance, E,TB, VU by  at 1/17/2020 1126                               User Key     Initials Effective Dates Name Provider Type Discipline     04/03/18 -  Jonnie Clark, PT Physical Therapist PT              PT Recommendation and Plan  Anticipated Discharge Disposition (PT): home with home health  Planned Therapy Interventions (PT Eval): bed mobility training, gait training, home exercise program, patient/family education, transfer training, stair training, strengthening  Therapy Frequency (PT Clinical Impression): 5 times/wk  Outcome Summary/Treatment Plan (PT)  Anticipated Discharge Disposition (PT): home with home health  Plan of Care Reviewed With: patient  Outcome Summary: PT Evaluation Complete: Patient performs supine to sit transfer with SBA, sit to/from stand transfers with min A, and gait x 20 feet with CGA with use of FWW. Patient demonstrates no loss of balance noted. Patient would benefit from skilled PT services to address deficits in functional mobility and LE strength. Plan to see patient daily 5x/week x 1 week. Recommend home health PT at discharge. Will continue to assess equipment needs.      Time Calculation:   PT Charges     Row Name 01/17/20 1131             Time Calculation    Start Time  0900  -BP      Stop Time  0935  -BP      Time Calculation (min)  35 min  -BP      PT Non-Billable Time (min)  15 min eval minutes   -BP      PT Received On  01/17/20  -BP      PT - Next Appointment  01/20/20  -BP          Time Calculation- PT    TCU Minutes- PT  20 min 15 co treat with OT   -BP        User Key  (r) = Recorded By, (t) = Taken By, (c) = Cosigned By    Initials Name Provider Type    BP Jonnie Clark, PT Physical Therapist        Therapy Charges for Today     Code Description Service Date Service Provider Modifiers Qty    68863482368 HC PT EVAL LOW COMPLEXITY 1 1/17/2020 Jonnie Clark, PT GP 1    25711672973 HC PT THER PROC EA 15 MIN 1/17/2020 Jonnie Clark PT GP 1                 Jonnie Clark, PT  1/17/2020

## 2020-01-17 NOTE — PLAN OF CARE
Problem: Patient Care Overview  Goal: Plan of Care Review  Flowsheets (Taken 1/17/2020 0901)  Outcome Summary: OT evaluation completed. pt required SBA for supine to sit transfer to EOB. pt required min assist for functional transfers with RW. ANticipate pt will require min assist for lb adls however pt states he is not interested in long handled ae at this time. pt would benefit from skilled OT services to address adl retraining, functional transfers and balance. OT will see pt 5 x week x 1 week with anticipated discharge home with family.

## 2020-01-17 NOTE — H&P
HISTORY AND PHYSICAL      Patient Care Team:  Oralia Hammonds MD as PCP - General  Oralia Hammonds MD as PCP - Family Medicine    CHIEF COMPLAINT: Generalized weakness for rehabilitation    HISTORY OF PRESENT ILLNESS:    89-year-old white male well-known to me who presented to [facility] emergency room with severe right flank pain.  Patient was found to have a 1.8 mm distal ureteric stone at the UVJ associated with moderate hydroureteronephrosis patient underwent a stone extraction and stent insertion on 1 9.  Patient developed significant hematuria with clots requiring irrigation and a prolonged post operative state.  He had some blood loss anemia with hemoglobin went down to 9.8 but then remained stable he is being transferred here for generalized weakness and for rehabilitation.    Past Medical History:   Diagnosis Date   • Atrial fibrillation, chronic    • Chronic kidney disease (CKD), stage III (moderate) (CMS/HCC)    • HTN (hypertension)    • Hyperlipidemia    • Kidney stones      Past Surgical History:   Procedure Laterality Date   • CYSTOSCOPY BLADDER STONE LITHOTRIPSY       History reviewed. No pertinent family history.  Social History     Tobacco Use   • Smoking status: Former Smoker   Substance Use Topics   • Alcohol use: Not on file   • Drug use: Not on file     Medications Prior to Admission   Medication Sig Dispense Refill Last Dose   • metoprolol succinate XL (TOPROL-XL) 25 MG 24 hr tablet Take 25 mg by mouth 2 (Two) Times a Day.      • amLODIPine (NORVASC) 5 MG tablet TAKE 1 TABLET DAILY 90 tablet 3 Taking   • ramipril (ALTACE) 10 MG capsule TAKE 1 CAPSULE DAILY 90 capsule 2 Taking   • simvastatin (ZOCOR) 40 MG tablet TAKE 1 TABLET AT BEDTIME 90 tablet 2 Taking   • zolpidem (AMBIEN) 10 MG tablet    Taking     Allergies:  Patient has no known allergies.     Review of Systems   Constitutional: Positive for appetite change and fatigue. Negative for activity change.  "  HENT: Negative for congestion.    Respiratory: Negative for cough, chest tightness, shortness of breath and wheezing.    Cardiovascular: Negative for chest pain.   Gastrointestinal: Negative for abdominal distention, abdominal pain, diarrhea, nausea and vomiting.   Endocrine: Negative for polyphagia and polyuria.   Genitourinary: Negative for frequency.   Skin: Negative for rash.   Neurological: Positive for weakness. Negative for light-headedness.   Hematological: Does not bruise/bleed easily.   Psychiatric/Behavioral: Negative for agitation and behavioral problems.       Vital Signs  Temp:  [98.1 °F (36.7 °C)] 98.1 °F (36.7 °C)  Heart Rate:  [80] 80  Resp:  [16] 16  BP: (134)/(63) 134/63  Oxygen Therapy  SpO2: 96 %  Pulse Oximetry Type: Intermittent  Device (Oxygen Therapy): room air}  Body mass index is 25.36 kg/m².  Flowsheet Rows      First Filed Value   Admission Height  172.7 cm (68\") Documented at 01/16/2020 1656   Admission Weight  75.7 kg (166 lb 12.8 oz) Documented at 01/16/2020 1656                 Physical Exam   Constitutional: He appears well-developed and well-nourished.   HENT:   Head: Normocephalic.   Mouth/Throat: Oropharynx is clear and moist.   Eyes: Conjunctivae are normal.   Neck: Normal range of motion. No JVD present. No thyromegaly present.   Cardiovascular: Normal rate. An irregularly irregular rhythm present.   Murmur heard.   Systolic murmur is present with a grade of 2/6.  Pulmonary/Chest: Effort normal and breath sounds normal. No respiratory distress. He has no wheezes. He has no rales.   Abdominal: Soft. Bowel sounds are normal. He exhibits no distension. There is no tenderness. There is no guarding.   Neurological: He is alert.   Skin: Skin is warm and dry. No rash noted.   Nursing note and vitals reviewed.       Debilities/Disabilities Identified: None    Emotional Behavior: Appropriate    Results Review:    I reviewed the patient's new clinical results.  Lab Results (most " recent)     Procedure Component Value Units Date/Time    MRSA Screen Culture - Swab, Nares [[id number]] Collected:  01/16/20 1710    Specimen:  Swab from Nares Updated:  01/16/20 1715    VRE Culture - Swab, Per Rectum [[id number]] Collected:  01/16/20 1710    Specimen:  Swab from Per Rectum Updated:  01/16/20 1715          Imaging Results (Most Recent)     None        reviewed CT scan chest x-ray from outside facility    ECG/EMG Results (most recent)     None        reviewed outside EKG    Assessment/Plan       1.  Generalized weakness from prolonged recent hospital stay PT noted to be consulted and further recommendations once evaluated.    2.  Permanent atrial fibrillation rate controlled nothing acute continue with home beta-blocker.  Patient was on chronic anticoagulation with Eliquis to be held because of his significant hematuria will be resumed once this is improved    3.  Hypertension well controlled continue home medications    4.  Hyperlipidemia nothing acute we will continue with present statin      5.  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  Recent right ureteric stone status post stone extraction and stent placement stable follow-up with urology as recommended    7.  DVT prophylaxis SCD      I discussed the patients findings and my recommendations with patient      Oralia Hammonds MD  01/16/20  7:46 PM      Much of this encounter note is an electronic transcription/translation of spoken language to printed text using Dragon Software

## 2020-01-17 NOTE — PLAN OF CARE
Problem: Patient Care Overview  Goal: Plan of Care Review  Flowsheets (Taken 1/17/2020 0900)  Plan of Care Reviewed With: patient  Outcome Summary: PT Evaluation Complete: Patient performs supine to sit transfer with SBA, sit to/from stand transfers with min A, and gait x 20 feet with CGA with use of FWW. Patient demonstrates no loss of balance noted. Patient would benefit from skilled PT services to address deficits in functional mobility and LE strength. Plan to see patient daily 5x/week x 1 week. Recommend home health PT at discharge. Will continue to assess equipment needs.

## 2020-01-17 NOTE — THERAPY EVALUATION
SNF - Occupational Therapy Initial Evaluation   [facility]     Patient Name: Tony Garrett  : 1926  MRN: [id number]  Today's Date: 2020  Onset of Illness/Injury or Date of Surgery: 20  Date of Referral to OT: 20  Referring Physician: Cassius    Admit Date: 2020     No diagnosis found.  Patient Active Problem List   Diagnosis   • Abnormal electrocardiogram   • Coronary arteriosclerosis in native artery   • Cardiomyopathy (CMS/HCC)   • Carotid bruit   • Dyspnea on exertion   • Sinus bradycardia   • Fatigue   • Encounter for rehabilitation     Past Medical History:   Diagnosis Date   • Atrial fibrillation, chronic    • Chronic kidney disease (CKD), stage III (moderate) (CMS/HCC)    • HTN (hypertension)    • Hyperlipidemia    • Kidney stones      Past Surgical History:   Procedure Laterality Date   • CYSTOSCOPY BLADDER STONE LITHOTRIPSY            OT ASSESSMENT FLOWSHEET (last 12 hours)      Occupational Therapy Evaluation     Row Name 20 0901                   OT Evaluation Time/Intention    Subjective Information  no complaints  -JJ        Document Type  evaluation  -JJ        Mode of Treatment  occupational therapy  -JJ        Patient Effort  adequate  -JJ        Symptoms Noted During/After Treatment  none  -JJ           General Information    Patient Profile Reviewed?  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 pt underwent uteroscopy with stone extraction and  stent insertion. pt reports he is independent with adls and performs functional mobility without assistive device.   -JJ        Existing Precautions/Restrictions  fall  -JJ        Risks Reviewed  patient:;LOB  -JJ        Benefits Reviewed  patient:;improve function;increase independence  -JJ        Barriers to Rehab  none identified  -JJ           Relationship/Environment    Lives With  spouse  -JJ           Resource/Environmental Concerns    Current Living Arrangements  home/apartment/condo  -           Home Main Entrance    Number of Stairs, Main Entrance  two  -JJ        Stairs Comment, Main Entrance  one HR did not specify side  -JJ           Cognitive Assessment/Interventions    Additional Documentation  Cognitive Assessment/Intervention (Group)  -JJ           Cognitive Assessment/Intervention- PT/OT    Orientation Status (Cognition)  oriented x 4  -JJ        Follows Commands (Cognition)  WFL  -JJ        Personal Safety Interventions  gait belt;nonskid shoes/slippers when out of bed  -JJ           Safety Issues, Functional Mobility    Safety Issues Affecting Function (Mobility)  positioning of assistive device  -JJ           Bed Mobility Assessment/Treatment    Bed Mobility Assessment/Treatment  supine-sit  -JJ        Supine-Sit [place] (Bed Mobility)  -- SBA  -J        Assistive Device (Bed Mobility)  bed rails;head of bed elevated  -JJ        Comment (Bed Mobility)  pt requires extended time to transfer to EOB  -           Functional Mobility    Functional Mobility- Ind. Level  contact guard assist;verbal cues required  -        Functional Mobility- Device  rolling walker  -        Functional Mobility-Distance (Feet)  20  -JJ           Transfer Assessment/Treatment    Transfer Assessment/Treatment  sit-stand transfer;stand-sit transfer  -J        Comment (Transfers)  pt required verbal cues for hand placement  -           Sit-Stand Transfer    Sit-Stand [place] (Transfers)  minimum  assist (75% patient effort);verbal cues  -JJ        Assistive Device (Sit-Stand Transfers)  walker, front-wheeled  -JJ           Stand-Sit Transfer    Stand-Sit [place] (Transfers)  contact guard;verbal cues  -JJ        Assistive Device (Stand-Sit Transfers)  walker, front-wheeled  -JJ           Bathing Assessment/Intervention    Bathing [place] Level  minimum assist (75% patient effort);lower body;bathing skills;upper body  -JJ           Lower Body Dressing Assessment/Training    Lower Body Dressing [place] Level  lower body dressing skills;minimum assist (75% patient effort)  -JJ           General ROM    GENERAL ROM COMMENTS  B UE AROM WFL  -JJ           MMT (Manual Muscle Testing)    General MMT Comments  B UE MMT 4+/5  -JJ           Positioning and Restraints    Pre-Treatment Position  in bed  -JJ        Post Treatment Position  chair  -JJ        In Chair  notified nsg;call light within reach;encouraged to call for assist  -JJ           Pain Scale: Numbers Pre/Post-Treatment    Pain Scale: Numbers, Pretreatment  0/10 - no pain  -JJ        Pain Scale: Numbers, Post-Treatment  0/10 - no pain  -JJ           Wound 01/16/20 2000 Right posterior elbow Abrasion    Wound - Properties Group Date first assessed: 01/16/20  -RS Time first assessed: 2000  -RS Side: Right  -RS Orientation: posterior  -RS Location: elbow  -RS Primary Wound Type: Abrasion  -RS       Plan of Care Review    Plan of Care Reviewed With  patient  -JJ        Outcome Summary  OT evaluation completed. pt required SBA for supine to sit transfer to EOB. pt required min assist for functional transfers with RW. ANticipate pt will require min assist for lb adls however pt states he is not interested in long handled ae at this time. pt would benefit from skilled OT services to address adl retraining, functional transfers and balance. OT will see pt 5 x week x 1 week with anticipated discharge home with family.   -JJ           Clinical  Impression (OT)    Date of Referral to OT  01/17/20  -JJ        OT Diagnosis  decreased adl sp hospitalization  -JJ        Prognosis (OT Eval)  good  -J        Patient/Family Goals Statement (OT Eval)  pt states plan is to return home with family  -J        Criteria for Skilled Therapeutic Interventions Met (OT Eval)  yes;treatment indicated  -J        Rehab Potential (OT Eval)  good, to achieve stated therapy goals  -JJ        Therapy Frequency (OT Eval)  5 times/wk  -J        Predicted Duration of Therapy Intervention (Therapy Eval)  x 1 week  -JJ        Care Plan Review (OT)  evaluation/treatment results reviewed;care plan/treatment goals reviewed;risks/benefits reviewed  -J        Anticipated Discharge Disposition (OT)  home with assist;home with home health  -J           Planned OT Interventions    Planned Therapy Interventions (OT Eval)  BADL retraining;functional balance retraining;transfer/mobility retraining;patient/caregiver education/training  -JJ        BADL Retraining  x  -JJ        Functional Balance Retraining  x  -JJ        Patient/Caregiver Training  x  -JJ        Transfer/Mobility Retraining  x  -JJ           Transfer Goal 1 (OT)    Activity/Assistive Device (Transfer Goal 1, OT)  toilet;commode, 3-in-1;walker, rolling  -JJ        [place] Level/Cues Needed (Transfer Goal 1, OT)  supervision required  -JJ        Time Frame (Transfer Goal 1, OT)  1 week  -JJ        Progress/Outcome (Transfer Goal 1, OT)  -- new goal  -JJ           Bathing Goal 1 (OT)    Activity/Assistive Device (Bathing Goal 1, OT)  lower body bathing with long handled sponge if needed  -JJ        [place] Level/Cues Needed (Bathing Goal 1, OT)  supervision required  -JJ        Time Frame (Bathing Goal 1, OT)  1 week  -JJ        Progress/Outcomes (Bathing Goal 1, OT)  -- new goal  -J           Dressing Goal 1 (OT)    Activity/Assistive Device (Dressing Goal 1, OT)  lower body dressing with long handled ae if needed   -JJ        [place]/Cues Needed (Dressing Goal 1, OT)  supervision required  -JJ        Time Frame (Dressing Goal 1, OT)  1 week  -JJ        Progress/Outcome (Dressing Goal 1, OT)  -- new goal  -J           Balance Goal 1 (OT)    Activity/Assistive Device (Balance Goal 1, OT)  standing, dynamic;walker, rolling  -JJ        [place] Level/Cues Needed (Balance Goal 1, OT)  supervision required x 5 minutes to increase I with adls  -JJ        Time Frame (Balance Goal 1, OT)  1 week  -JJ        Progress/Outcomes (Balance Goal 1, OT)  -- new goal  -JJ           Living Environment    Home Accessibility  stairs to enter home  -JJ          User Key  (r) = Recorded By, (t) = Taken By, (c) = Cosigned By    Initials Name Effective Dates    Lisbet Lynn, OTR 06/22/16 -     RS Sindy Henson RN 04/28/17 -          Occupational Therapy Education                 Title: PT OT SLP Therapies (In Progress)     Topic: Occupational Therapy (In Progress)     Point: ADL training (Done)     Description:   Instruct learner(s) on proper safety adaptation and remediation techniques during self care or transfers.   Instruct in proper use of assistive devices.              Learning Progress Summary           Patient Acceptance, E,TB, VU by  at 1/17/2020 1328    Comment:  pt educated on adls, long handled ae and safety with functional transfers and mobility                               User Key     Initials Effective Dates Name Provider Type Discipline     06/22/16 -  Lisbet Le, OTR Occupational Therapist OT                  OT Recommendation and Plan  Outcome Summary/Treatment Plan (OT)  Anticipated Discharge Disposition (OT): home with assist, home with home health  Planned Therapy Interventions (OT Eval): BADL retraining, functional balance retraining, transfer/mobility retraining, patient/caregiver education/training  Therapy Frequency (OT Eval): 5 times/wk  Plan of Care Review  Plan of Care  Reviewed With: patient  Plan of Care Reviewed With: patient  Outcome Summary: OT evaluation completed. pt required SBA for supine to sit transfer to EOB. pt required min assist for functional transfers with RW. ANticipate pt will require min assist for lb adls however pt states he is not interested in long handled ae at this time. pt would benefit from skilled OT services to address adl retraining, functional transfers and balance. OT will see pt 5 x week x 1 week with anticipated discharge home with family.         Time Calculation:   Time Calculation- OT     Row Name 01/17/20 1335             Time Calculation- OT    OT Start Time  0900  -      OT Stop Time  0930  -      OT Time Calculation (min)  30 min  -      OT Non-Billable Time (min)  -- evaluation: 15 minutes treatment/cotreat with PT: 15 minutes  -        User Key  (r) = Recorded By, (t) = Taken By, (c) = Cosigned By    Initials Name Provider Type    iLsbet Lynn OTR Occupational Therapist        Therapy Charges for Today     Code Description Service Date Service Provider Modifiers Qty    53804460802  OT SELF CARE/MGMT/TRAIN EA 15 MIN 1/17/2020 Lisbet Le OTR GO 1    [id number]  OT EVAL LOW COMPLEXITY 1 1/17/2020 Lisbet Le OTR GO 1               [place]  1/17/2020

## 2020-01-17 NOTE — PROGRESS NOTES
"Daily Progress Note:      Chief complaint: Follow-up of right ureteric stone status post extraction with postoperative hematuria, atrial fibrillation, hypertension    Subjective: Still feels tired weak otherwise no new complaints   LOS: 1 day     Vital Signs  Temp:  [98.1 °F (36.7 °C)-98.4 °F (36.9 °C)] 98.4 °F (36.9 °C)  Heart Rate:  [76-80] 76  Resp:  [16-18] 18  BP: (134-144)/(63-67) 144/67  Oxygen Therapy  SpO2: 97 %  Pulse Oximetry Type: Intermittent  Device (Oxygen Therapy): room air}  Body mass index is 25.36 kg/m².  Flowsheet Rows      First Filed Value   Admission Height  172.7 cm (68\") Documented at 01/16/2020 1656   Admission Weight  75.7 kg (166 lb 12.8 oz) Documented at 01/16/2020 1656                   Documented weights    01/16/20 1656   Weight: 75.7 kg (166 lb 12.8 oz)           Patient Vitals for the past 24 hrs:   BP Temp Temp src Pulse Resp SpO2 Height Weight   01/16/20 2020 144/67 98.4 °F (36.9 °C) Oral 76 18 97 % -- --   01/16/20 1656 134/63 98.1 °F (36.7 °C) Oral 80 16 96 % 172.7 cm (68\") 75.7 kg (166 lb 12.8 oz)       75.7 kg (166 lb 12.8 oz)      Intake/Output Summary (Last 24 hours) at 1/17/2020 0758  Last data filed at 1/17/2020 0454  Gross per 24 hour   Intake 240 ml   Output 800 ml   Net -560 ml       Review of Systems   Constitutional: Positive for activity change and fatigue. Negative for appetite change.   HENT: Negative for congestion.    Respiratory: Negative for cough, chest tightness, shortness of breath and wheezing.    Cardiovascular: Negative for chest pain.   Gastrointestinal: Negative for abdominal distention, abdominal pain, diarrhea, nausea and vomiting.   Endocrine: Negative for polyphagia and polyuria.   Genitourinary: Negative for frequency.   Skin: Negative for rash.   Neurological: Negative for light-headedness.   Hematological: Does not bruise/bleed easily.   Psychiatric/Behavioral: Negative for agitation and behavioral problems.       Physical Exam   Constitutional: " He appears well-developed and well-nourished.   HENT:   Head: Normocephalic.   Mouth/Throat: Oropharynx is clear and moist.   Eyes: Conjunctivae are normal.   Neck: Normal range of motion. No JVD present. No thyromegaly present.   Cardiovascular: Normal rate. An irregularly irregular rhythm present.   Murmur heard.  Pulmonary/Chest: Effort normal and breath sounds normal. No respiratory distress. He has no wheezes. He has no rales.   Abdominal: Soft. Bowel sounds are normal. He exhibits no distension. There is no tenderness. There is no guarding.   Neurological: He is alert.   Skin: Skin is warm and dry. No rash noted.   Nursing note and vitals reviewed.      Medication Review:   I have reviewed the patient's current medication list  Scheduled Meds:  amLODIPine 5 mg Oral Daily   influenza virus vacc split PF 0.5 mL Intramuscular Once   metoprolol succinate XL 25 mg Oral BID   ramipril 10 mg Oral Daily   simvastatin 40 mg Oral Daily   zolpidem 5 mg Oral Nightly     Continuous Infusions:   PRN Meds:.•  acetaminophen  •  bisacodyl  •  docusate sodium  •  magnesium hydroxide  •  ondansetron **OR** ondansetron  •  senna-docusate sodium      Labs:  Results from last 7 days   Lab Units 01/16/20  0347 01/15/20  1311   WBC 10*3/uL 4.44*  --    HEMOGLOBIN g/dL 9.5* 9.2*   HEMATOCRIT % 29.5* 27.8*   PLATELETS 10*3/uL 147  --      Results from last 7 days   Lab Units 01/16/20  0347 01/15/20  1311   SODIUM mmol/L 137 139   POTASSIUM mmol/L 4.3 4.4   CHLORIDE mmol/L 110* 110*   TOTAL CO2 mmol/L 19* 20*   BUN mg/dL 37* 43*   CREATININE mg/dL 1.1 1.2   CALCIUM mg/dL 8.6 8.6           Lab Results (last 24 hours)     Procedure Component Value Units Date/Time    MRSA Screen Culture - Swab, Nares [[id number]] Collected:  01/16/20 1710    Specimen:  Swab from Nares Updated:  01/16/20 1715    VRE Culture - Swab, Per Rectum [[id number]] Collected:  01/16/20 1710    Specimen:  Swab from Per Rectum Updated:  01/16/20 1715                                   Invalid input(s): LDLCALC          No results found for: POCGLU                      Radiology:  Imaging Results (Last 24 Hours)     ** No results found for the last 24 hours. **          Cardiology:  ECG/EMG Results (last 24 hours)     ** No results found for the last 24 hours. **          I have reviewed recent labs results and consult notes.  Parts of this note may have been copied and pasted but patient was examined and interviewed by me today    Assessment and Plan:          1.  Generalized weakness from prolonged recent hospital stay PT noted to be consulted and further recommendations once evaluated.     2.  Permanent atrial fibrillation rate controlled nothing acute continue with home beta-blocker.  Patient was on chronic anticoagulation with Eliquis to be held because of his significant hematuria will be resumed once this is improved     3.  Hypertension well controlled continue home medications     4.  Hyperlipidemia nothing acute we will continue with present statin        5.  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  Recent right ureteric stone status post stone extraction and stent placement stable follow-up with urology as recommended     7.  DVT prophylaxis SCD     Much of this encounter note is an electronic transcription/translation of spoken language to printed text using Dragon Software

## 2020-01-18 LAB
ANION GAP SERPL CALCULATED.3IONS-SCNC: 9.5 MMOL/L (ref 5–15)
BUN BLD-MCNC: 23 MG/DL (ref 8–23)
BUN/CREAT SERPL: 24 (ref 7–25)
CALCIUM SPEC-SCNC: 8.6 MG/DL (ref 8.2–9.6)
CHLORIDE SERPL-SCNC: 104 MMOL/L (ref 98–107)
CO2 SERPL-SCNC: 23.5 MMOL/L (ref 22–29)
CREAT BLD-MCNC: 0.96 MG/DL (ref 0.76–1.27)
DEPRECATED RDW RBC AUTO: 45.7 FL (ref 37–54)
ERYTHROCYTE [DISTWIDTH] IN BLOOD BY AUTOMATED COUNT: 13.3 % (ref 12.3–15.4)
GFR SERPL CREATININE-BSD FRML MDRD: 73 ML/MIN/1.73
GLUCOSE BLD-MCNC: 99 MG/DL (ref 65–99)
HCT VFR BLD AUTO: 28.4 % (ref 37.5–51)
HEMOCCULT STL QL: POSITIVE
HGB BLD-MCNC: 8.9 G/DL (ref 13–17.7)
MCH RBC QN AUTO: 29.3 PG (ref 26.6–33)
MCHC RBC AUTO-ENTMCNC: 31.3 G/DL (ref 31.5–35.7)
MCV RBC AUTO: 93.4 FL (ref 79–97)
PLATELET # BLD AUTO: 177 10*3/MM3 (ref 140–450)
PMV BLD AUTO: 9.3 FL (ref 6–12)
POTASSIUM BLD-SCNC: 4.3 MMOL/L (ref 3.5–5.2)
RBC # BLD AUTO: 3.04 10*6/MM3 (ref 4.14–5.8)
SODIUM BLD-SCNC: 137 MMOL/L (ref 136–145)
VRE SPEC QL CULT: NORMAL
WBC NRBC COR # BLD: 6.64 10*3/MM3 (ref 3.4–10.8)

## 2020-01-18 PROCEDURE — 82272 OCCULT BLD FECES 1-3 TESTS: CPT | Performed by: FAMILY MEDICINE

## 2020-01-18 PROCEDURE — 85027 COMPLETE CBC AUTOMATED: CPT | Performed by: FAMILY MEDICINE

## 2020-01-18 PROCEDURE — 80048 BASIC METABOLIC PNL TOTAL CA: CPT | Performed by: FAMILY MEDICINE

## 2020-01-18 RX ORDER — PANTOPRAZOLE SODIUM 40 MG/1
40 TABLET, DELAYED RELEASE ORAL EVERY MORNING
Status: DISCONTINUED | OUTPATIENT
Start: 2020-01-19 | End: 2020-01-20

## 2020-01-18 RX ADMIN — WHITE PETROLATUM 41 % TOPICAL OINTMENT: OINTMENT at 22:58

## 2020-01-18 RX ADMIN — ZOLPIDEM TARTRATE 5 MG: 5 TABLET ORAL at 23:32

## 2020-01-18 RX ADMIN — WHITE PETROLATUM 41 % TOPICAL OINTMENT: OINTMENT at 09:36

## 2020-01-18 RX ADMIN — METOPROLOL SUCCINATE 25 MG: 25 TABLET, EXTENDED RELEASE ORAL at 22:56

## 2020-01-18 RX ADMIN — METOPROLOL SUCCINATE 25 MG: 25 TABLET, EXTENDED RELEASE ORAL at 09:32

## 2020-01-18 RX ADMIN — SIMVASTATIN 40 MG: 40 TABLET, FILM COATED ORAL at 22:57

## 2020-01-18 RX ADMIN — AMLODIPINE BESYLATE 5 MG: 5 TABLET ORAL at 09:32

## 2020-01-18 RX ADMIN — RAMIPRIL 10 MG: 2.5 CAPSULE ORAL at 09:31

## 2020-01-18 NOTE — PLAN OF CARE
Patient did not use call light. Patient attempted to go to bathroom and fell. Bed alarm was place. Post fall order set done, family and MD notified. Pt. Back in bed with no complaints of pain. Patient was educated on using call light to go to the bathroom.

## 2020-01-18 NOTE — NURSING NOTE
Patient did not use call light. Patient attempted to go to bathroom and fell hit head on rail of bed pt. Got a small knot on left side of forehead. Bed alarm was place. Post fall order set done, family and MD notified. Pt. Back in bed with no complaints of pain. Patient was educated on using call light to go to the bathroom.

## 2020-01-18 NOTE — PLAN OF CARE
Problem: Patient Care Overview  Goal: Plan of Care Review  Outcome: Ongoing (interventions implemented as appropriate)  Flowsheets  Taken 1/18/2020 1812  Progress: improving  Taken 1/18/2020 1010  Plan of Care Reviewed With: patient     Problem: Fall Risk (Adult)  Goal: Identify Related Risk Factors and Signs and Symptoms  Outcome: Ongoing (interventions implemented as appropriate)

## 2020-01-19 LAB
DEPRECATED RDW RBC AUTO: 45.6 FL (ref 37–54)
ERYTHROCYTE [DISTWIDTH] IN BLOOD BY AUTOMATED COUNT: 13.3 % (ref 12.3–15.4)
HCT VFR BLD AUTO: 27.8 % (ref 37.5–51)
HEMOCCULT STL QL: POSITIVE
HGB BLD-MCNC: 8.7 G/DL (ref 13–17.7)
MCH RBC QN AUTO: 29.3 PG (ref 26.6–33)
MCHC RBC AUTO-ENTMCNC: 31.3 G/DL (ref 31.5–35.7)
MCV RBC AUTO: 93.6 FL (ref 79–97)
PLATELET # BLD AUTO: 179 10*3/MM3 (ref 140–450)
PMV BLD AUTO: 9.6 FL (ref 6–12)
RBC # BLD AUTO: 2.97 10*6/MM3 (ref 4.14–5.8)
WBC NRBC COR # BLD: 6.26 10*3/MM3 (ref 3.4–10.8)

## 2020-01-19 PROCEDURE — 85027 COMPLETE CBC AUTOMATED: CPT | Performed by: FAMILY MEDICINE

## 2020-01-19 PROCEDURE — 82272 OCCULT BLD FECES 1-3 TESTS: CPT | Performed by: FAMILY MEDICINE

## 2020-01-19 RX ADMIN — METOPROLOL SUCCINATE 25 MG: 25 TABLET, EXTENDED RELEASE ORAL at 21:42

## 2020-01-19 RX ADMIN — PANTOPRAZOLE SODIUM 40 MG: 40 TABLET, DELAYED RELEASE ORAL at 06:32

## 2020-01-19 RX ADMIN — SIMVASTATIN 40 MG: 40 TABLET, FILM COATED ORAL at 21:44

## 2020-01-19 RX ADMIN — ZOLPIDEM TARTRATE 5 MG: 5 TABLET ORAL at 21:42

## 2020-01-19 RX ADMIN — RAMIPRIL 10 MG: 2.5 CAPSULE ORAL at 10:16

## 2020-01-19 RX ADMIN — WHITE PETROLATUM 41 % TOPICAL OINTMENT: OINTMENT at 10:17

## 2020-01-19 RX ADMIN — METOPROLOL SUCCINATE 25 MG: 25 TABLET, EXTENDED RELEASE ORAL at 10:16

## 2020-01-19 NOTE — PROGRESS NOTES
"Daily Progress Note:      Chief complaint: Follow-up of right ureteric stone status post extraction with postoperative hematuria, atrial fibrillation, hypertension    Subjective: Still feels tired weak complains of dark stools since yesterday morning patient forgot to tell me yesterday any abdominal pain nausea or vomiting     LOS: 2 days     Vital Signs  Temp:  [97.7 °F (36.5 °C)-98.2 °F (36.8 °C)] 98.2 °F (36.8 °C)  Heart Rate:  [68-85] 68  Resp:  [16-18] 18  BP: (117-126)/(56-60) 126/60  Oxygen Therapy  SpO2: 97 %  Pulse Oximetry Type: Intermittent  Device (Oxygen Therapy): room air}  Body mass index is 25.36 kg/m².  Flowsheet Rows      First Filed Value   Admission Height  172.7 cm (68\") Documented at 01/16/2020 1656   Admission Weight  75.7 kg (166 lb 12.8 oz) Documented at 01/16/2020 1656                   Documented weights    01/16/20 1656   Weight: 75.7 kg (166 lb 12.8 oz)           Patient Vitals for the past 24 hrs:   BP Temp Temp src Pulse Resp SpO2   01/18/20 1920 126/60 98.2 °F (36.8 °C) Oral 68 18 97 %   01/18/20 1009 117/57 97.7 °F (36.5 °C) Oral 73 18 97 %   01/18/20 0250 118/56 98.1 °F (36.7 °C) Oral 85 16 96 %       75.7 kg (166 lb 12.8 oz)      Intake/Output Summary (Last 24 hours) at 1/18/2020 2228  Last data filed at 1/18/2020 1920  Gross per 24 hour   Intake 720 ml   Output 875 ml   Net -155 ml       Review of Systems   Constitutional: Positive for activity change and fatigue. Negative for appetite change.   HENT: Negative for congestion.    Respiratory: Negative for cough, chest tightness, shortness of breath and wheezing.    Cardiovascular: Negative for chest pain.   Gastrointestinal: Negative for abdominal distention, abdominal pain, diarrhea, nausea and vomiting.   Endocrine: Negative for polyphagia and polyuria.   Genitourinary: Negative for frequency.   Skin: Negative for rash.   Neurological: Negative for light-headedness.   Hematological: Does not bruise/bleed easily. "   Psychiatric/Behavioral: Negative for agitation and behavioral problems.       Physical Exam   Constitutional: He appears well-developed and well-nourished.   HENT:   Head: Normocephalic.   Mouth/Throat: Oropharynx is clear and moist.   Eyes: Conjunctivae are normal.   Neck: Normal range of motion. No JVD present. No thyromegaly present.   Cardiovascular: Normal rate. An irregularly irregular rhythm present.   Murmur heard.  Pulmonary/Chest: Effort normal and breath sounds normal. No respiratory distress. He has no wheezes. He has no rales.   Abdominal: Soft. Bowel sounds are normal. He exhibits no distension. There is no tenderness. There is no guarding.   Genitourinary:   Genitourinary Comments: Rectal exam revealed dark stool on glove sent for Hemoccult   Neurological: He is alert.   Skin: Skin is warm and dry. No rash noted.   Nursing note and vitals reviewed.      Medication Review:   I have reviewed the patient's current medication list  Scheduled Meds:    amLODIPine 5 mg Oral Daily   AQUAPHOR ADVANCED THERAPY  Topical Q12H   influenza virus vacc split PF 0.5 mL Intramuscular Once   metoprolol succinate XL 25 mg Oral BID   ramipril 10 mg Oral Daily   simvastatin 40 mg Oral Daily   zolpidem 5 mg Oral Nightly     Continuous Infusions:   PRN Meds:.•  acetaminophen  •  bisacodyl  •  docusate sodium  •  magnesium hydroxide  •  ondansetron **OR** ondansetron  •  sennosides-docusate      Labs:  Results from last 7 days   Lab Units 01/16/20  0347 01/15/20  1311   WBC 10*3/uL 4.44*  --    HEMOGLOBIN g/dL 9.5* 9.2*   HEMATOCRIT % 29.5* 27.8*   PLATELETS 10*3/uL 147  --      Results from last 7 days   Lab Units 01/16/20  0347 01/15/20  1311   SODIUM mmol/L 137 139   POTASSIUM mmol/L 4.3 4.4   CHLORIDE mmol/L 110* 110*   TOTAL CO2 mmol/L 19* 20*   BUN mg/dL 37* 43*   CREATININE mg/dL 1.1 1.2   CALCIUM mg/dL 8.6 8.6           Lab Results (last 24 hours)     Procedure Component Value Units Date/Time    Occult Blood X 1,  Stool - Stool, Per Rectum [[id number]]  (Abnormal) Collected:  01/18/20 2149    Specimen:  Stool from Per Rectum Updated:  01/18/20 2156     Fecal Occult Blood Positive    VRE Culture - Swab, Per Rectum [[id number]]  (Normal) Collected:  01/16/20 1710    Specimen:  Swab from Per Rectum Updated:  01/18/20 1429     VRE SCREEN CX No Vancomycin Resistant Enterococcus Isolated    MRSA Screen Culture - Swab, Nares [[id number]]  (Normal) Collected:  01/16/20 1710    Specimen:  Swab from Nares Updated:  01/17/20 2235     MRSA SCREEN CX No Methicillin Resistant Staphylococcus aureus isolated                                  Invalid input(s): LDLCALC          No results found for: POCGLU      Results from last 7 days   Lab Units 01/16/20 1710   MRSA SCREEN CX  No Methicillin Resistant Staphylococcus aureus isolated                 Radiology:  Imaging Results (Last 24 Hours)     ** No results found for the last 24 hours. **          Cardiology:  ECG/EMG Results (last 24 hours)     ** No results found for the last 24 hours. **          I have reviewed recent labs results and consult notes.  Parts of this note may have been copied and pasted but patient was examined and interviewed by me today    Assessment and Plan:          1.  Generalized weakness from prolonged recent hospital stay PT noted to be consulted and further recommendations once evaluated.     2.  Permanent atrial fibrillation rate controlled nothing acute continue with home beta-blocker.  Patient was on chronic anticoagulation with Eliquis to be held because of his significant hematuria will be resumed once this is improved     3.  Hypertension well controlled continue home medications     4.  Hyperlipidemia nothing acute we will continue with present statin        5.  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  Recent right ureteric stone status post stone extraction and stent placement stable follow-up with urology as recommended     7.  Heme  positive stools we will check a CBC and BMP         Much of this encounter note is an electronic transcription/translation of spoken language to printed text using Dragon Software

## 2020-01-19 NOTE — PLAN OF CARE
Problem: Patient Care Overview  Goal: Plan of Care Review  Outcome: Ongoing (interventions implemented as appropriate)  Flowsheets  Taken 1/19/2020 0327  Progress: no change  Taken 1/19/2020 1014  Plan of Care Reviewed With: patient     Problem: Fall Risk (Adult)  Goal: Identify Related Risk Factors and Signs and Symptoms  Outcome: Ongoing (interventions implemented as appropriate)

## 2020-01-19 NOTE — PROGRESS NOTES
"Daily Progress Note:      Chief complaint: Follow-up of right ureteric stone status post extraction with postoperative hematuria, atrial fibrillation, hypertension    Subjective: Still feels tired weak complains of dark stools this morning but less so   LOS: 3 days     Vital Signs  Temp:  [97.4 °F (36.3 °C)-98.2 °F (36.8 °C)] 97.4 °F (36.3 °C)  Heart Rate:  [68-77] 77  Resp:  [16-18] 16  BP: (101-126)/(60-61) 101/61  Oxygen Therapy  SpO2: 97 %  Pulse Oximetry Type: Intermittent  Device (Oxygen Therapy): room air}  Body mass index is 25.36 kg/m².  Flowsheet Rows      First Filed Value   Admission Height  172.7 cm (68\") Documented at 01/16/2020 1656   Admission Weight  75.7 kg (166 lb 12.8 oz) Documented at 01/16/2020 1656                   Documented weights    01/16/20 1656   Weight: 75.7 kg (166 lb 12.8 oz)           Patient Vitals for the past 24 hrs:   BP Temp Temp src Pulse Resp SpO2   01/19/20 1014 101/61 97.4 °F (36.3 °C) Oral 77 16 97 %   01/18/20 1920 126/60 98.2 °F (36.8 °C) Oral 68 18 97 %       75.7 kg (166 lb 12.8 oz)      Intake/Output Summary (Last 24 hours) at 1/19/2020 1259  Last data filed at 1/19/2020 0800  Gross per 24 hour   Intake 240 ml   Output 500 ml   Net -260 ml       Review of Systems   Constitutional: Positive for activity change and fatigue. Negative for appetite change.   HENT: Negative for congestion.    Respiratory: Negative for cough, chest tightness, shortness of breath and wheezing.    Cardiovascular: Negative for chest pain.   Gastrointestinal: Negative for abdominal distention, abdominal pain, diarrhea, nausea and vomiting.   Endocrine: Negative for polyphagia and polyuria.   Genitourinary: Negative for frequency.   Skin: Negative for rash.   Neurological: Negative for light-headedness.   Hematological: Does not bruise/bleed easily.   Psychiatric/Behavioral: Negative for agitation and behavioral problems.       Physical Exam   Constitutional: He appears well-developed and " well-nourished.   HENT:   Head: Normocephalic.   Mouth/Throat: Oropharynx is clear and moist.   Eyes: Conjunctivae are normal.   Neck: Normal range of motion. No JVD present. No thyromegaly present.   Cardiovascular: Normal rate. An irregularly irregular rhythm present.   Murmur heard.  Pulmonary/Chest: Effort normal and breath sounds normal. No respiratory distress. He has no wheezes. He has no rales.   Abdominal: Soft. Bowel sounds are normal. He exhibits no distension. There is no tenderness. There is no guarding.   Neurological: He is alert.   Skin: Skin is warm and dry. No rash noted.   Nursing note and vitals reviewed.      Medication Review:   I have reviewed the patient's current medication list  Scheduled Meds:    amLODIPine 5 mg Oral Daily   AQUAPHOR ADVANCED THERAPY  Topical Q12H   influenza virus vacc split PF 0.5 mL Intramuscular Once   metoprolol succinate XL 25 mg Oral BID   pantoprazole 40 mg Oral QAM   ramipril 10 mg Oral Daily   simvastatin 40 mg Oral Daily   zolpidem 5 mg Oral Nightly     Continuous Infusions:   PRN Meds:.•  acetaminophen  •  bisacodyl  •  docusate sodium  •  magnesium hydroxide  •  ondansetron **OR** ondansetron  •  sennosides-docusate      Labs:  Results from last 7 days   Lab Units 01/19/20  0809 01/18/20  2311 01/16/20  0347 01/15/20  1311   WBC 10*3/mm3 6.26 6.64 4.44*  --    HEMOGLOBIN g/dL 8.7* 8.9* 9.5* 9.2*   HEMATOCRIT % 27.8* 28.4* 29.5* 27.8*   PLATELETS 10*3/mm3 179 177 147  --      Results from last 7 days   Lab Units 01/18/20  2311 01/16/20  0347 01/15/20  1311   SODIUM mmol/L 137 137 139   POTASSIUM mmol/L 4.3 4.3 4.4   CHLORIDE mmol/L 104 110* 110*   TOTAL CO2 mmol/L  --  19* 20*   CO2 mmol/L 23.5  --   --    BUN mg/dL 23 37* 43*   CREATININE mg/dL 0.96 1.1 1.2   CALCIUM mg/dL 8.6 8.6 8.6   GLUCOSE mg/dL 99  --   --            Lab Results (last 24 hours)     Procedure Component Value Units Date/Time    CBC (No Diff) [[id number]]  (Abnormal) Collected:  01/19/20  0809    Specimen:  Blood Updated:  01/19/20 0831     WBC 6.26 10*3/mm3      RBC 2.97 10*6/mm3      Hemoglobin 8.7 g/dL      Hematocrit 27.8 %      MCV 93.6 fL      MCH 29.3 pg      MCHC 31.3 g/dL      RDW 13.3 %      RDW-SD 45.6 fl      MPV 9.6 fL      Platelets 179 10*3/mm3     Basic Metabolic Panel [[id number]]  (Normal) Collected:  01/18/20 2311    Specimen:  Blood Updated:  01/18/20 2336     Glucose 99 mg/dL      BUN 23 mg/dL      Creatinine 0.96 mg/dL      Sodium 137 mmol/L      Potassium 4.3 mmol/L      Chloride 104 mmol/L      CO2 23.5 mmol/L      Calcium 8.6 mg/dL      eGFR Non African Amer 73 mL/min/1.73      BUN/Creatinine Ratio 24.0     Anion Gap 9.5 mmol/L     Narrative:       GFR Normal >60  Chronic Kidney Disease <60  Kidney Failure <15      CBC (No Diff) [[id number]]  (Abnormal) Collected:  01/18/20 2311    Specimen:  Blood Updated:  01/18/20 2318     WBC 6.64 10*3/mm3      RBC 3.04 10*6/mm3      Hemoglobin 8.9 g/dL      Hematocrit 28.4 %      MCV 93.4 fL      MCH 29.3 pg      MCHC 31.3 g/dL      RDW 13.3 %      RDW-SD 45.7 fl      MPV 9.3 fL      Platelets 177 10*3/mm3     Occult Blood X 1, Stool - Stool, Per Rectum [[id number]]  (Abnormal) Collected:  01/18/20 2149    Specimen:  Stool from Per Rectum Updated:  01/18/20 2156     Fecal Occult Blood Positive    VRE Culture - Swab, Per Rectum [[id number]]  (Normal) Collected:  01/16/20 1710    Specimen:  Swab from Per Rectum Updated:  01/18/20 1429     VRE SCREEN CX No Vancomycin Resistant Enterococcus Isolated                                  Invalid input(s): LDLCALC          No results found for: POCGLU      Results from last 7 days   Lab Units 01/16/20  1710   MRSA SCREEN CX  No Methicillin Resistant Staphylococcus aureus isolated                 Radiology:  Imaging Results (Last 24 Hours)     ** No results found for the last 24 hours. **          Cardiology:  ECG/EMG Results (last 24 hours)     ** No results found for the last 24 hours. **          I  have reviewed recent labs results and consult notes.  Parts of this note may have been copied and pasted but patient was examined and interviewed by me today    Assessment and Plan:          1.  Generalized weakness from prolonged recent hospital stay PT noted to be consulted and further recommendations once evaluated.     2.  Permanent atrial fibrillation rate controlled nothing acute continue with home beta-blocker.  Patient was on chronic anticoagulation with Eliquis to be held because of his significant hematuria will be resumed once this is improved     3.  Hypertension well controlled continue home medications     4.  Hyperlipidemia nothing acute we will continue with present statin        5.  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  Recent right ureteric stone status post stone extraction and stent placement stable follow-up with urology as recommended     7.  Heme positive stools H&H is slightly decreased but not significant we will get GI to evaluate the patient PPI ordered empirically       Much of this encounter note is an electronic transcription/translation of spoken language to printed text using Dragon Software

## 2020-01-20 LAB
ANION GAP SERPL CALCULATED.3IONS-SCNC: 7.5 MMOL/L (ref 5–15)
BACTERIA UR QL AUTO: ABNORMAL /HPF
BILIRUB UR QL STRIP: NEGATIVE
BUN BLD-MCNC: 20 MG/DL (ref 8–23)
BUN/CREAT SERPL: 21.3 (ref 7–25)
CALCIUM SPEC-SCNC: 9 MG/DL (ref 8.2–9.6)
CHLORIDE SERPL-SCNC: 106 MMOL/L (ref 98–107)
CLARITY UR: ABNORMAL
CO2 SERPL-SCNC: 24.5 MMOL/L (ref 22–29)
COLOR UR: ABNORMAL
CREAT BLD-MCNC: 0.94 MG/DL (ref 0.76–1.27)
DEPRECATED RDW RBC AUTO: 44.8 FL (ref 37–54)
ERYTHROCYTE [DISTWIDTH] IN BLOOD BY AUTOMATED COUNT: 13.3 % (ref 12.3–15.4)
GFR SERPL CREATININE-BSD FRML MDRD: 75 ML/MIN/1.73
GLUCOSE BLD-MCNC: 94 MG/DL (ref 65–99)
GLUCOSE UR STRIP-MCNC: NEGATIVE MG/DL
HCT VFR BLD AUTO: 26.2 % (ref 37.5–51)
HGB BLD-MCNC: 8.5 G/DL (ref 13–17.7)
HGB UR QL STRIP.AUTO: ABNORMAL
HYALINE CASTS UR QL AUTO: ABNORMAL /LPF
KETONES UR QL STRIP: NEGATIVE
LEUKOCYTE ESTERASE UR QL STRIP.AUTO: NEGATIVE
MCH RBC QN AUTO: 30 PG (ref 26.6–33)
MCHC RBC AUTO-ENTMCNC: 32.4 G/DL (ref 31.5–35.7)
MCV RBC AUTO: 92.6 FL (ref 79–97)
NITRITE UR QL STRIP: NEGATIVE
PH UR STRIP.AUTO: <=5 [PH] (ref 4.5–8)
PLATELET # BLD AUTO: 168 10*3/MM3 (ref 140–450)
PMV BLD AUTO: 9.3 FL (ref 6–12)
POTASSIUM BLD-SCNC: 4.1 MMOL/L (ref 3.5–5.2)
PROT UR QL STRIP: ABNORMAL
RBC # BLD AUTO: 2.83 10*6/MM3 (ref 4.14–5.8)
RBC # UR: ABNORMAL /HPF
REF LAB TEST METHOD: ABNORMAL
SODIUM BLD-SCNC: 138 MMOL/L (ref 136–145)
SP GR UR STRIP: 1.02 (ref 1–1.03)
SQUAMOUS #/AREA URNS HPF: ABNORMAL /HPF
UROBILINOGEN UR QL STRIP: ABNORMAL
WBC NRBC COR # BLD: 6.02 10*3/MM3 (ref 3.4–10.8)
WBC UR QL AUTO: ABNORMAL /HPF

## 2020-01-20 PROCEDURE — 97535 SELF CARE MNGMENT TRAINING: CPT

## 2020-01-20 PROCEDURE — 81001 URINALYSIS AUTO W/SCOPE: CPT | Performed by: UROLOGY

## 2020-01-20 PROCEDURE — 85027 COMPLETE CBC AUTOMATED: CPT | Performed by: FAMILY MEDICINE

## 2020-01-20 PROCEDURE — 99221 1ST HOSP IP/OBS SF/LOW 40: CPT | Performed by: INTERNAL MEDICINE

## 2020-01-20 PROCEDURE — 97116 GAIT TRAINING THERAPY: CPT

## 2020-01-20 PROCEDURE — 80048 BASIC METABOLIC PNL TOTAL CA: CPT | Performed by: FAMILY MEDICINE

## 2020-01-20 RX ORDER — PANTOPRAZOLE SODIUM 40 MG/1
40 TABLET, DELAYED RELEASE ORAL
Status: DISCONTINUED | OUTPATIENT
Start: 2020-01-20 | End: 2020-01-25 | Stop reason: HOSPADM

## 2020-01-20 RX ORDER — MAGNESIUM HYDROXIDE 1200 MG/15ML
LIQUID ORAL
Status: COMPLETED
Start: 2020-01-20 | End: 2020-01-20

## 2020-01-20 RX ADMIN — WHITE PETROLATUM 41 % TOPICAL OINTMENT: OINTMENT at 10:02

## 2020-01-20 RX ADMIN — RAMIPRIL 10 MG: 2.5 CAPSULE ORAL at 10:00

## 2020-01-20 RX ADMIN — ZOLPIDEM TARTRATE 5 MG: 5 TABLET ORAL at 22:51

## 2020-01-20 RX ADMIN — WATER: 1 IRRIGANT IRRIGATION at 13:35

## 2020-01-20 RX ADMIN — PANTOPRAZOLE SODIUM 40 MG: 40 TABLET, DELAYED RELEASE ORAL at 18:04

## 2020-01-20 RX ADMIN — AMLODIPINE BESYLATE 5 MG: 5 TABLET ORAL at 10:00

## 2020-01-20 RX ADMIN — SIMVASTATIN 40 MG: 40 TABLET, FILM COATED ORAL at 22:52

## 2020-01-20 RX ADMIN — WHITE PETROLATUM 41 % TOPICAL OINTMENT: OINTMENT at 22:57

## 2020-01-20 RX ADMIN — PANTOPRAZOLE SODIUM 40 MG: 40 TABLET, DELAYED RELEASE ORAL at 12:01

## 2020-01-20 RX ADMIN — METOPROLOL SUCCINATE 25 MG: 25 TABLET, EXTENDED RELEASE ORAL at 22:51

## 2020-01-20 RX ADMIN — ACETAMINOPHEN 650 MG: 325 TABLET, FILM COATED ORAL at 09:34

## 2020-01-20 RX ADMIN — METOPROLOL SUCCINATE 25 MG: 25 TABLET, EXTENDED RELEASE ORAL at 10:00

## 2020-01-20 NOTE — PLAN OF CARE
"  Problem: Patient Care Overview  Goal: Plan of Care Review  Note:   PT: Patient requires encouragement to participate.  Patient states \"I have already been up earlier today.\"  Encouragement required for continued mobility throughout the day.  Patient performs bed mobility with supervision and sit to/from stand with CGA.  Patient performs gait x102 feet with CGA/min assist, requiring increased assistance to correct balance loss during direction change.  Patient refuses ambulation in hallway during session and requires encouragement for participation in progressive activities.     "

## 2020-01-20 NOTE — PLAN OF CARE
Problem: Patient Care Overview  Goal: Plan of Care Review  Flowsheets  Taken 1/20/2020 1437 by Clemente Martin OT Student  Progress: improving (Pended)  Outcome Summary: OT: Pt performed transfers and mobility with CGA;however pt has a history of BLE neuropathy which increases his falls risk. Pt able to don/doff sock/slippers on R LE independently, yet he required min assist for L LE due to chronic limited ROM and c/o pain in left knee. Pt reports he has support at home if needed for adl's, yet he was receptive to further education with compensatory strategies/use of adaptive equipment to allow for increased adl independence. (Pended)  Taken 1/20/2020 1209 by Sandra Maher, RN  Plan of Care Reviewed With: patient

## 2020-01-20 NOTE — PROGRESS NOTES
"Adult Nutrition  Assessment/PES    Patient Name:  Tony Garrett  YOB: 1926  MRN: [id number]  Admit Date:  1/16/2020    Assessment Date:  1/20/2020    Comments:  Po intake 67% of meals.   Recommend: d/c cardiac restriction, pt reports Regular diet at home and may also help him eat more.  Declined oral supplement needs.     Reason for Assessment     Row Name 01/20/20 1356          Reason for Assessment    Reason For Assessment  per organizational policy admission assessment     Diagnosis  other (see comments) rehab for weakness s/p stone/stent/clots, + heme stool, afib         Nutrition/Diet History     Row Name 01/20/20 1358          Nutrition/Diet History    Typical Food/Fluid Intake  Spoke w pt at bedside. NKFA. Denies issue chew/swallowing. Fair appetite, due to recent hospitalization he reports. Wants to have regular diet so he can have salt. Declines CIB/Boost.          Anthropometrics     Row Name 01/20/20 1354          Anthropometrics    Height Method  measured reported standing ht on admit, armspan also completed      Height  174 cm (68.5\")     Weight  -- 166.8#        Ideal Body Weight (IBW)    Ideal Body Weight (IBW) (kg)  72.29        Usual Body Weight (UBW)    Usual Body Weight  -- 160's, 165# 11/8/19     Weight Loss Time Frame  no 30 day wt, no 6 month wt         Body Mass Index (BMI)    BMI Assessment  BMI 25-29.9: overweight         Labs/Tests/Procedures/Meds     Row Name 01/20/20 1400          Labs/Procedures/Meds    Lab Results Reviewed  reviewed     Lab Results Comments  H/H 8.5/26.2         Diagnostic Tests/Procedures    Diagnostic Test/Procedure Reviewed  reviewed     Diagnostic Test/Procedures Comments  +fecal blood        Medications    Pertinent Medications Reviewed  reviewed           Estimated/Assessed Needs     Row Name 01/20/20 1401 01/20/20 1359       Calculation Measurements    Height  --  174 cm (68.5\")       Estimated/Assessed Needs    Additional Documentation  Calorie " Requirements (Group);Protein Requirements (Group);Fluid Requirements (Group)  --       Calorie Requirements    Estimated Calorie Need Method  [place]  --    Estimated Calorie Requirement Comment  1651 kcal ( mifflin 1. 2)   --       Protein Requirements    Est Protein Requirement Amount (gms/kg)  1.0 gm protein 76 gm pro  --       Fluid Requirements    Estimated Fluid Requirement Method  RDA Method 1651 ml   --        Nutrition Prescription Ordered     Row Name 01/20/20 1402          Nutrition Prescription PO    Common Modifiers  Cardiac         Evaluation of Received Nutrient/Fluid Intake     Row Name 01/20/20 1402          Fluid Intake Evaluation    Oral Fluid (mL)  600 ave x 3, 36%        PO Evaluation    Number of Meals  9     % PO Intake  67               Problem/Interventions:  Problem 1     Row Name 01/20/20 1403          Nutrition Diagnoses Problem 1    Problem 1  Nutrition Appropriate for Condition at this Time               Intervention Goal     Row Name 01/20/20 1404          Intervention Goal    General  Meet nutritional needs for age/condition     PO  Maintain intake;PO intake (%)     PO Intake %  70 % or greater     Weight  Maintain weight         Nutrition Intervention     Row Name 01/20/20 1406          Nutrition Intervention    RD/Tech Action  Interview for preference;Encourage intake;Recommend/ordered;Supplement offered/refused;Follow Tx progress     Recommended/Ordered  Supplement           Education/Evaluation     Row Name 01/20/20 1400          Education    Education  No discharge needs identified at this time        Monitor/Evaluation    Monitor  Per protocol;I&O;PO intake;Pertinent labs;Weight;Symptoms           Electronically signed by:  Anay Danielle RD  01/20/20 2:05 PM

## 2020-01-20 NOTE — CONSULTS
[facility]              Initial Inpatient Consult Note  CC: kidney stone, blood in urine and lower abdominal pain  Referring Provider: Dr. Hammonds  Reason for Consultation: possible melena    Subjective     History of present illness: 89-year-old gentleman with past medical history significant for atrial fibrillation on chronic anticoagulation presented to [facility] with lower abdominal pain and flank pain was found to have a UVJ stone with associated hydro-nephrosis.  He underwent stone extraction and stent placement.  Postoperatively he had significant hematuria and anemia.  His hemoglobin baseline prior to admission was around 12 and and subsequently went down to about 9.8.  He was sent to [place] slow-paced rehab for rehabilitation and weakness.  Saturday he noticed that he was having dark stools no abdominal pain nausea or vomiting.  He denies any previous episodes of GI bleeding.  His hemoglobin about 4 days ago was 9.5 and currently it is 8.5.  He had one bowel movement this morning and per nurses note it was not black it was a dark green consistency no gross blood noted.  He had some lower abdominal pain and difficulty urinating.  A Lopez catheter was then placed back in an large amounts of blood and blood clot was noted coming out of the bladder.  Urology has been notified.  A gastroenterology consult has been requested for possible melena and GI bleed.  He denies any previous upper endoscopies.  He thinks he had a colonoscopy about 10 years ago done by Dr. Rodriguez at [facility] in [place].  He recalls polyps being removed.  He denies any previous history for ulcers and no NSAID use.  He denies any dysphasia or odynophagia.  Past Medical History:  Past Medical History:   Diagnosis Date   • Atrial fibrillation, chronic    • Chronic kidney disease (CKD), stage III (moderate) (CMS/HCC)    • HTN (hypertension)    • Hyperlipidemia    • Kidney stones         Past Surgical History:  Past Surgical History:   Procedure Laterality Date   • CYSTOSCOPY BLADDER STONE LITHOTRIPSY          Social History:   Social History     Tobacco Use   • Smoking status: Former Smoker   Substance Use Topics   • Alcohol use: Not on file        Family History:  History reviewed. No pertinent family history.    Home Meds:  Medications Prior to Admission   Medication Sig Dispense Refill Last Dose   • metoprolol succinate XL (TOPROL-XL) 25 MG 24 hr tablet Take 25 mg by mouth 2 (Two) Times a Day.      • amLODIPine (NORVASC) 5 MG tablet TAKE 1 TABLET DAILY 90 tablet 3 Taking   • ramipril (ALTACE) 10 MG capsule TAKE 1 CAPSULE DAILY 90 capsule 2 Taking   • simvastatin (ZOCOR) 40 MG tablet TAKE 1 TABLET AT BEDTIME 90 tablet 2 Taking   • zolpidem (AMBIEN) 10 MG tablet    Taking       Current Meds:     amLODIPine 5 mg Oral Daily   AQUAPHOR ADVANCED THERAPY  Topical Q12H   influenza virus vacc split PF 0.5 mL Intramuscular Once   metoprolol succinate XL 25 mg Oral BID   pantoprazole 40 mg Oral BID AC   ramipril 10 mg Oral Daily   simvastatin 40 mg Oral Daily   sterile water      zolpidem 5 mg Oral Nightly       Allergies:  No Known Allergies    Review of Systems  All systems were reviewed and negative except for:  Constitution:  positive for fatigue  Genitourinary: postivie for  difficulty / inability to void and blood in urine     Objective     Vital Signs  Temp:  [98.3 °F (36.8 °C)-99.1 °F (37.3 °C)] 98.3 °F (36.8 °C)  Heart Rate:  [86-95] 95  Resp:  [16-20] 20  BP: (133-141)/(60-65) 133/60    Physical Exam:  General Appearance:    Alert, cooperative, in no acute distress   Head:    Normocephalic, without obvious abnormality, atraumatic   Eyes:            Lids and lashes normal, conjunctivae and sclerae normal, no   icterus   Throat:   No oral lesions, no thrush, oral mucosa moist   Neck:   No adenopathy, supple, trachea midline, no thyromegaly, no   carotid bruit, no JVD   Lungs:     Clear to  auscultation,respirations regular, even and                   unlabored    Heart:    Regular rhythm and normal rate, normal S1 and S2, no            murmur, no gallop, no rub, no click   Chest Wall:    No abnormalities observed   Abdomen:     Normal bowel sounds, no masses, no organomegaly, soft        abdomen is soft and nontender.   Rectal:     Deferred   Extremities:   no edema, no cyanosis, no redness   Skin:   No bleeding, bruising or rash   Lymph nodes:   No palpable adenopathy   Psychiatric:  Judgement and insight: normal   Orientation to person place and time: normal   Mood and affect: normal     Results Review:   I reviewed the patient's new clinical results.    WBC No results found for: WBCS   HGB Hemoglobin   Date Value Ref Range Status   01/20/2020 8.5 (L) 13.0 - 17.7 g/dL Final   01/19/2020 8.7 (L) 13.0 - 17.7 g/dL Final   01/18/2020 8.9 (L) 13.0 - 17.7 g/dL Final      HCT Hematocrit   Date Value Ref Range Status   01/20/2020 26.2 (L) 37.5 - 51.0 % Final   01/19/2020 27.8 (L) 37.5 - 51.0 % Final   01/18/2020 28.4 (L) 37.5 - 51.0 % Final      Platlets No results found for: LABPLAT   MCV MCV   Date Value Ref Range Status   01/20/2020 92.6 79.0 - 97.0 fL Final   01/19/2020 93.6 79.0 - 97.0 fL Final   01/18/2020 93.4 79.0 - 97.0 fL Final          Sodium Sodium   Date Value Ref Range Status   01/20/2020 138 136 - 145 mmol/L Final   01/18/2020 137 136 - 145 mmol/L Final      Potassium Potassium   Date Value Ref Range Status   01/20/2020 4.1 3.5 - 5.2 mmol/L Final   01/18/2020 4.3 3.5 - 5.2 mmol/L Final      Chloride Chloride   Date Value Ref Range Status   01/20/2020 106 98 - 107 mmol/L Final   01/18/2020 104 98 - 107 mmol/L Final      CO2 CO2   Date Value Ref Range Status   01/20/2020 24.5 22.0 - 29.0 mmol/L Final   01/18/2020 23.5 22.0 - 29.0 mmol/L Final      BUN BUN   Date Value Ref Range Status   01/20/2020 20 8 - 23 mg/dL Final   01/18/2020 23 8 - 23 mg/dL Final      Creatinine Creatinine   Date Value  Ref Range Status   01/20/2020 0.94 0.76 - 1.27 mg/dL Final   01/18/2020 0.96 0.76 - 1.27 mg/dL Final      Calcium Calcium   Date Value Ref Range Status   01/20/2020 9.0 8.2 - 9.6 mg/dL Final   01/18/2020 8.6 8.2 - 9.6 mg/dL Final      Albumin No results found for: ALBUMIN   AST  ALT  PT/INR:   No results found for: AST  No results found for: ALT  No results found for: PROTIME/No results found for: INR         Imaging Results (Last 72 Hours)     ** No results found for the last 72 hours. **          Assessment/Plan     Anemia with hemeoccult positive stools and possible melena-he is currently on once daily Protonix I have increased this to twice daily.  His stools today per nurse was more green in color nothing like black stools noted on Saturday.  I have offered upper endoscopy for further evaluation but he has declined this.  He is not wanting any further interventions at this time.  He wants to wait and see if there is any further bleeding.  He has obvious hematuria now which could definitely contribute to his ongoing issues with anemia.  We will check his hemoglobin in the morning and follow-up.  Further recommendations to follow.  I discussed the patients findings and my recommendations with patient and nursing staff    Aimee Garcia MD  [facility]  01/20/20  2:28 PM      Time:

## 2020-01-20 NOTE — THERAPY TREATMENT NOTE
SNF - Physical Therapy Treatment Note   [facility]     Patient Name: Tony Garrett  : 1926  MRN: [id number]  Today's Date: 2020  Onset of Illness/Injury or Date of Surgery: 20  Date of Referral to PT: 20  Referring Physician: Cassius    Admit Date: 2020    Visit Dx:  No diagnosis found.  Patient Active Problem List   Diagnosis   • Abnormal electrocardiogram   • Coronary arteriosclerosis in native artery   • Cardiomyopathy (CMS/HCC)   • Carotid bruit   • Dyspnea on exertion   • Sinus bradycardia   • Fatigue   • Encounter for rehabilitation       Therapy Treatment    Rehabilitation Treatment Summary     Row Name 20 1345 20 1053 20 0956       Treatment Time/Intention    Discipline  physical therapist  -  occupational therapist  -SD,GC,SD2  --  -EN    Document Type  therapy note (daily note)  -  therapy note (daily note)  -SD,GC,SD2  --  -EN    Subjective Information  complains of;weakness;fatigue  -  complains of;pain;weakness  -SD,GC,SD2  --  -EN    Mode of Treatment  physical therapy  -  occupational therapy  -SD,GC,SD2  --  -EN    Patient/Family Observations  pt supine, agrees to therapy with encouragement  -  Pt supine agreed to therapy  -SD,GC,SD2  --  -EN    Care Plan Review  care plan/treatment goals reviewed;risks/benefits reviewed;patient/other agree to care plan  -  care plan/treatment goals reviewed;risks/benefits reviewed;current/potential barriers reviewed;patient/other agree to care plan  -SD,GC,SD2  --  -EN    Patient Effort  adequate  -  good  -SD,GC,SD2  --  -EN    Comment  requires encouragement for participation  -  --  --    Existing Precautions/Restrictions  fall  -  --  --    Treatment Considerations/Comments  --  Pt reports chronic decreased ROM/pain in L knee  -SD3  --    Patient Response to Treatment  --  --  -SD3  --    Recorded by [] Laura Shane, PT 20 4196 [SD,GC,SD2] Fransico Arroyo, OTR (r) Clemente Martin,  OT Student (t) Fransico Arroyo OTLAURIE (c) 01/20/20 1436  [SD3] Fransico Arroyo, OTR 01/20/20 1436 [EN] Priyanka Conti, OTR 01/20/20 1238    Row Name 01/20/20 1345 01/20/20 1053 01/20/20 0956       Cognitive Assessment/Intervention- PT/OT    Personal Safety Interventions  gait belt;nonskid shoes/slippers when out of bed  -JW  gait belt;fall prevention program maintained;nonskid shoes/slippers when out of bed;supervised activity  -SD  gait belt;nonskid shoes/slippers when out of bed  -EN    Recorded by [JW] Laura Shane, PT 01/20/20 1546 [SD] Fransico Arroyo, OTR 01/20/20 1436 [EN] Priyanka Conti OTR 01/20/20 1153    Row Name 01/20/20 1053             Safety Issues, Functional Mobility    Impairments Affecting Function (Mobility)  range of motion (ROM)  -SD,GC,SD2      Comment, Safety Issues/Impairments (Mobility)  Pt stated he has a history of BLE neuropathy and left knee pain/decreased rom which impacts lower body adl's/mobility  -SD3      Recorded by [SD,GC,SD2] Fransico Arroyo, [place] (r) Clemente Martin OT Student (t) Fransico Arroyo, OTR (c) 01/20/20 1436  [SD3] Fransico Arroyo OTR 01/20/20 1436      Row Name 01/20/20 1345 01/20/20 1053          Bed Mobility Assessment/Treatment    Bed Mobility Assessment/Treatment  supine-sit;sit-supine  -JW  supine-sit  -SD,GC,SD2     Supine-Sit [place] (Bed Mobility)  supervision;verbal cues  -JW  conditional independence  -SD3     Sit-Supine [place] (Bed Mobility)  supervision;verbal cues  -JW  --     Assistive Device (Bed Mobility)  bed rails;head of bed elevated  -JW  bed rails  -SD,GC,SD2     Comment (Bed Mobility)  requires increased time to complete with maximal independence  -JW  --     Recorded by [JW] Laura Shane, PT 01/20/20 1546 [SD,GC,SD2] Fransico Arroyo, [place] (r) Clemente Martin OT Student (t) Fransico Arroyo, [place] (c) 01/20/20 1436  [SD3] Fransico Arroyo OTR 01/20/20 1436     Row Name 01/20/20 1053              Functional Mobility    Functional Mobility- Ind. Level  contact guard assist  -SD,GC,SD2      Functional Mobility- Device  rolling walker  -SD,GC,SD2      Functional Mobility-Distance (Feet)  20  -SD,GC,SD2      Functional Mobility- Safety Issues  step length decreased;weight-shifting ability decreased  -SD,GC,SD2      Functional Mobility- Comment  pt with history of BLE neuropathy and left knee pain/decreased rom both factors may impact safety with mobility  -SD3      Recorded by [SD,GC,SD2] Fransico Arroyo OTR (r) Cle[place] Martin, OT Student (t) Fransico Arroyo OTR (c) 01/20/20 1436  [SD3] Fransico Arroyo OTR 01/20/20 1436      Row Name 01/20/20 1345 01/20/20 1053          Transfer Assessment/Treatment    Transfer Assessment/Treatment  sit-stand transfer;stand-sit transfer  -JW  sit-stand transfer;stand-sit transfer  -SD,GC,SD2     Recorded by [JW] Laura Shane, PT 01/20/20 1546 [SD,GC,SD2] Fransico Arroyo OTR (r) Clemente Martin, OT Student (t) Fransico Arroyo OTR (c) 01/20/20 1436     Row Name 01/20/20 1345 01/20/20 1053          Sit-Stand Transfer    Sit-Stand [place] (Transfers)  contact guard;verbal cues  -  stand by assist  -SD,GC,SD2     Assistive Device (Sit-Stand Transfers)  walker, front-wheeled  -JW  walker, front-wheeled  -SD,GC,SD2     Recorded by [JW] Laura Shane, PT 01/20/20 1546 [SD,GC,SD2] Fransico Arroyo OTR (r) Clemente Martin, OT Student (t) Fransico Arroyo OTR (c) 01/20/20 1436     Row Name 01/20/20 1345 01/20/20 1053          Stand-Sit Transfer    Stand-Sit [place] (Transfers)  contact guard;verbal cues  -JW  stand by assist  -SD,GC,SD2     Assistive Device (Stand-Sit Transfers)  walker, front-wheeled  -JW  walker, front-wheeled  -SD,GC,SD2     Recorded by [] Laura Shane, PT 01/20/20 1546 [SD,GC,SD2] Fransico Arroyo, [place] (r) Clemente Martin OT Student (t) Fransico Arroyo, OSCARR (c) 01/20/20 1436     Row Name 01/20/20 1345             Gait/Stairs  Assessment/Training    [place] Level (Gait)  contact guard;minimum assist (75% patient effort);verbal cues  -JW      Assistive Device (Gait)  walker, front-wheeled  -JW      Distance in Feet (Gait)  102  -JW      Pattern (Gait)  step-through  -JW      Deviations/Abnormal Patterns (Gait)  base of support, narrow;shantal decreased  -JW      Left Sided Gait Deviations  heel strike decreased;forward flexed posture  -JW      Comment (Gait/Stairs)  Patient with decreased step length bilaterally, noted decreased heel strike on left and increased hip/knee flexion bilaterally during swing phase.  Patient with 1 episode of balance loss during direction change, requiring min assist to correct.  Patient comples additional direction changes with CGA.  pt refuses ambulation in hallway.  -JW      Recorded by [JW] Laura Shane, PT 01/20/20 1546      Row Name 01/20/20 1053             Lower Body Dressing Assessment/Training    Lower Body Dressing [place] Level  doff;don;shoes/slippers;socks;minimum assist (75% patient effort);independent independent with RLE, min assist for LLE  -SD      Lower Body Dressing Position  unsupported sitting  -SD2,GC,SD3      Comment (Lower Body Dressing)  Pt able to don/doff sock/slippers on R LE independently. L LE required min A due to chronic limited ROM and pain in left knee. Pain from catheter also limited LB dressing of L LE.  -SD      Recorded by [SD] Fransico Arroyo OTR 01/20/20 1436  [SD2,GC,SD3] Fransico Arroyo OTR (r) Clemente Martin OT Student (t) Fransico Arroyo OTR (c) 01/20/20 1436      Row Name 01/20/20 1053             BADL Safety/Performance    Impairments, BADL Safety/Performance  pain;range of motion;balance  -SD      Skilled BADL Treatment/Intervention  compensatory training;adaptive equipment training Pt reported willingness to utilize AE (not receptive at eval  -SD      Recorded by [SD] Fransico Arroyo OTR 01/20/20 1436      Row Name 01/20/20 1345 01/20/20  "1053          Positioning and Restraints    Pre-Treatment Position  in bed  -JW  in bed  -SD,GC,SD2     Post Treatment Position  bed  -JW  chair  -SD,GC,SD2     In Bed  supine;call light within reach;encouraged to call for assist GI MD in room  -JW  --     In Chair  --  call light within reach;encouraged to call for assist  -SD,GC,SD2     Recorded by [JW] Laura Shane, PT 01/20/20 1546 [SD,GC,SD2] Fransico Arroyo OTR (r) Clemente Martin OT Student (t) Fransico Arroyo OTR (c) 01/20/20 1436     Row Name 01/20/20 1345             Pain Assessment    Additional Documentation  -- no c/o pain  -JW      Recorded by [JW] Laura Shane, PT 01/20/20 1546      Row Name 01/20/20 1053             Pain Scale: Numbers Pre/Post-Treatment    Pain Scale: Numbers, Pretreatment  0/10 - no pain  -SD,GC,SD2      Pain Scale: Numbers, Post-Treatment  0/10 - no pain  -SD,GC,SD2      Pre/Post Treatment Pain Comment  Pt reported pain in left knee when performing LB dressing task.  -SD,GC,SD2      Recorded by [SD,GC,SD2] Fransico Arroyo OTR (r) Clemente Martin OT Student (t) Fransico Arroyo OTR (c) 01/20/20 1436      Row Name                Wound 01/16/20 2000 Right posterior elbow Abrasion    Wound - Properties Group Date first assessed: 01/16/20 [RS] Time first assessed: 2000 [RS] Side: Right [RS] Orientation: posterior [RS] Location: elbow [RS] Primary Wound Type: Abrasion [RS] Recorded by:  [RS] Sindy Henson RN 01/17/20 0346    Row Name 01/20/20 1345 01/20/20 1053          Plan of Care Review    Plan of Care Reviewed With  patient  -JW  patient  -SD,GC,SD2     Progress  --  improving  -SD,GC,SD2     Outcome Summary  PT: Patient requires encouragement to participate.  Patient states \"I have already been up earlier today.\"  Encouragement required for continued mobility throughout the day.  Patient performs bed mobility with supervision and sit to/from stand with CGA.  Patient performs gait x102 feet with CGA/min " assist, requiring increased assistance to correct balance loss during direction change.  Patient refuses ambulation in hallway during session and requires encouragement for participation in progressive activities.  -NANI  OT: Pt performed transfers and mobility with CGA;however pt has a history of BLE neuropathy which increases his falls risk. Pt able to don/doff sock/slippers on R LE independently, yet he required min assist for L LE due to chronic limited ROM and c/o pain in left knee. Pt reports he has support at home if needed for adl's, yet he was receptive to further education with compensatory strategies/use of adaptive equipment to allow for increased adl independence.   -SD3     Recorded by [JW] Laura Shane, PT 01/20/20 1546 [SD,GC,SD2] Fransico Arroyo OTLAURIE (r) Clemente Martin OT Student (t) Fransico Arroyo OTLAURIE (c) 01/20/20 1436  [SD3] Fransico Arroyo OTR 01/20/20 1436     Row Name 01/20/20 1053             Outcome Summary/Treatment Plan (OT)    Daily Summary of Progress (OT)  progress toward functional goals is good  -SD,GC,SD2      Plan for Continued Treatment (OT)  continue with POC  -SD,GC,SD2      Recorded by [SD,GC,SD2] Fransico Arroyo OTR (r) Clemente Martin OT Student (t) Fransico Arroyo, OTR (c) 01/20/20 1436      Row Name 01/20/20 1345             Outcome Summary/Treatment Plan (PT)    Daily Summary of Progress (PT)  progress toward functional goals is good  -JW      Recorded by [JW] Laura Shane, PT 01/20/20 1546        User Key  (r) = Recorded By, (t) = Taken By, (c) = Cosigned By    Initials Name Effective Dates Discipline    EN Priyanka Conti, OTR 06/22/16 -  OT    Fransico Govea OTR 06/22/16 -  OT    Laura Agustin, PT 04/03/18 -  PT    Sindy Kimbrough, RN 04/28/17 -  Nurse    Clemente Rocha OT Student 01/08/20 -  OT          Wound 01/16/20 2000 Right posterior elbow Abrasion (Active)   Dressing Appearance dry;intact 1/20/2020 12:09 PM   Closure  "CINDY 1/20/2020 12:09 PM   Base dressing in place, unable to visualize 1/20/2020 12:09 PM   Drainage Amount none 1/19/2020  9:45 PM               PT Recommendation and Plan     Outcome Summary/Treatment Plan (PT)  Daily Summary of Progress (PT): progress toward functional goals is good  Plan of Care Reviewed With: patient  Outcome Summary: PT: Patient requires encouragement to participate.  Patient states \"I have already been up earlier today.\"  Encouragement required for continued mobility throughout the day.  Patient performs bed mobility with supervision and sit to/from stand with CGA.  Patient performs gait x102 feet with CGA/min assist, requiring increased assistance to correct balance loss during direction change.  Patient refuses ambulation in hallway during session and requires encouragement for participation in progressive activities.     Time Calculation:   PT Charges     Row Name 01/20/20 1547             Time Calculation    Start Time  1345  -      Stop Time  1400  -      Time Calculation (min)  15 min  -      PT Received On  01/20/20  -      PT - Next Appointment  01/21/20  -         Time Calculation- PT    TCU Minutes- PT  15 min  -         Timed Charges    35313 - Gait Training Minutes   15  -        User Key  (r) = Recorded By, (t) = Taken By, (c) = Cosigned By    Initials Name Provider Type    Laura Agustin, PT Physical Therapist        Therapy Charges for Today     Code Description Service Date Service Provider Modifiers Qty    02078287879 HC GAIT TRAINING EA 15 MIN 1/20/2020 Laura Shane, PT GP 1               Laura Shane, PT  1/20/2020       "

## 2020-01-20 NOTE — CONSULTS
FIRST UROLOGY CONSULT      Patient Identification:  NAME:  Tony Garrett  Age:  89 y.o.   Sex:  male   :  1926   MRN:  [id number]       Chief complaint: Difficulty urinating    History of present illness: History remotely of prostate cancer who most recently presented himself with distal ureteral stone was found [facility] by Dr. Ruggiero to have a bladder neck contracture that was dilated then ureteroscopic removal of the stone followed by stent placement and removal during the same hospitalization now here for rehabilitation he was on a three-way irrigation for short period time while in the hospital at [place] has been voiding fine until most recently began experiencing some difficulty over the weekend bladder scan showed of the 300 cc residual Lopez catheter was placed with some mild difficulty he has been having some blood in his urine the patient self is having no discomfort pain and feels that the bladder is draining no flank pain      Past medical history:  Past Medical History:   Diagnosis Date   • Atrial fibrillation, chronic    • Chronic kidney disease (CKD), stage III (moderate) (CMS/HCC)    • HTN (hypertension)    • Hyperlipidemia    • Kidney stones        Past surgical history:  Past Surgical History:   Procedure Laterality Date   • CYSTOSCOPY BLADDER STONE LITHOTRIPSY         Allergies:  Patient has no known allergies.    Home medications:  Medications Prior to Admission   Medication Sig Dispense Refill Last Dose   • metoprolol succinate XL (TOPROL-XL) 25 MG 24 hr tablet Take 25 mg by mouth 2 (Two) Times a Day.      • amLODIPine (NORVASC) 5 MG tablet TAKE 1 TABLET DAILY 90 tablet 3 Taking   • ramipril (ALTACE) 10 MG capsule TAKE 1 CAPSULE DAILY 90 capsule 2 Taking   • simvastatin (ZOCOR) 40 MG tablet TAKE 1 TABLET AT BEDTIME 90 tablet 2 Taking   • zolpidem (AMBIEN) 10 MG tablet    Taking        Hospital medications:    sterile water      amLODIPine 5 mg Oral Daily    [facility]  Topical Q12H   influenza virus vacc split PF 0.5 mL Intramuscular Once   metoprolol succinate XL 25 mg Oral BID   pantoprazole 40 mg Oral BID AC   ramipril 10 mg Oral Daily   simvastatin 40 mg Oral Daily   zolpidem 5 mg Oral Nightly        acetaminophen  •  bisacodyl  •  docusate sodium  •  magnesium hydroxide  •  ondansetron **OR** ondansetron  •  sennosides-docusate    Family history:  History reviewed. No pertinent family history.    Social history:  Social History     Tobacco Use   • Smoking status: Former Smoker   Substance Use Topics   • Alcohol use: Not on file   • Drug use: Not on file       Review of systems:    Negative 12-system ROS except for the following: Gross hematuria      Objective:  TMax 24 hours:   Temp (24hrs), Av.7 °F (37.1 °C), Min:98.3 °F (36.8 °C), Max:99.1 °F (37.3 °C)      Vitals Ranges:   Temp:  [98.3 °F (36.8 °C)-99.1 °F (37.3 °C)] 98.3 °F (36.8 °C)  Heart Rate:  [86-95] 95  Resp:  [16-20] 20  BP: (133-141)/(60-65) 133/60    Intake/Output Last 3 shifts:  I/O last 3 completed shifts:  In: 720 [P.O.:720]  Out: 500 [Urine:500]     Physical Exam:       General Appearance:    Alert, cooperative, in no acute distress   Head:    Normocephalic, without obvious abnormality, atraumatic   Eyes:          conjunctivae and corneas clear   Ears:    Normal external inspection   Throat:   No oral lesions, oral mucosa moist   Neck:   Supple, no LAD, trachea midline   Back:     No CVA tenderness   Lungs:     Respirations unlabored, symmetric excursion    Heart:    RRR, intact peripheral pulses   Abdomen:    Soft no masses bladder scan showed no residual Lopez catheter was irrigated very easily with a clear return with minimal clots   :   Pain in scrotum and testicles and perineum are normal catheter irrigates as mentioned above   [place]:  Extremities:     No edema, no deformity   Skin:   No bleeding, bruising or rashes   Neuro/Psych:        Results review:   I reviewed the  patient's new clinical results.    Data review:  Lab Results (last 24 hours)     Procedure Component Value Units Date/Time    Basic Metabolic Panel [[id number]]  (Normal) Collected:  01/20/20 0528    Specimen:  Blood Updated:  01/20/20 0557     Glucose 94 mg/dL      BUN 20 mg/dL      Creatinine 0.94 mg/dL      Sodium 138 mmol/L      Potassium 4.1 mmol/L      Chloride 106 mmol/L      CO2 24.5 mmol/L      Calcium 9.0 mg/dL      eGFR Non African Amer 75 mL/min/1.73      BUN/Creatinine Ratio 21.3     Anion Gap 7.5 mmol/L     Narrative:       GFR Normal >60  Chronic Kidney Disease <60  Kidney Failure <15      CBC (No Diff) [[id number]]  (Abnormal) Collected:  01/20/20 0528    Specimen:  Blood Updated:  01/20/20 0539     WBC 6.02 10*3/mm3      RBC 2.83 10*6/mm3      Hemoglobin 8.5 g/dL      Hematocrit 26.2 %      MCV 92.6 fL      MCH 30.0 pg      MCHC 32.4 g/dL      RDW 13.3 %      RDW-SD 44.8 fl      MPV 9.3 fL      Platelets 168 10*3/mm3     Occult Blood X 1, Stool - Stool, Per Rectum [[id number]]  (Abnormal) Collected:  01/19/20 1615    Specimen:  Stool from Per Rectum Updated:  01/19/20 1651     Fecal Occult Blood Positive           Imaging:  Imaging Results (Last 24 Hours)     ** No results found for the last 24 hours. **             Assessment:       Encounter for rehabilitation    Gross hematuria transient clot retention with recent urological surgery for bladder neck contracture and ureteroscopic stone extraction and stent placement and removal    Plan:     Lopez is been placed bladder scan shows no residual irrigates to clear we will leave Lopez catheter in for the next few days if urine remains clear then voiding trial accordingly I reviewed the patient's previous records most recently from [facility] and with them for my office no further evaluation is necessary at this time except for a anticipated ultrasound of his kidneys probably in about 4 to 6 weeks I will culture the urine just to make sure  he is infection free    Marco Bustamante MD  01/20/20  1:36 PM

## 2020-01-20 NOTE — THERAPY TREATMENT NOTE
SNF - Occupational Therapy Treatment Note  ANGELICA Almanzar     Patient Name: Tony Garrett  : 1926  MRN: [id number]  Today's Date: 2020  Onset of Illness/Injury or Date of Surgery: 20  Date of Referral to OT: 20  Referring Physician: Cassius    Admit Date: 2020     No diagnosis found.  Patient Active Problem List   Diagnosis   • Abnormal electrocardiogram   • Coronary arteriosclerosis in native artery   • Cardiomyopathy (CMS/HCC)   • Carotid bruit   • Dyspnea on exertion   • Sinus bradycardia   • Fatigue   • Encounter for rehabilitation     Past Medical History:   Diagnosis Date   • Atrial fibrillation, chronic    • Chronic kidney disease (CKD), stage III (moderate) (CMS/HCC)    • HTN (hypertension)    • Hyperlipidemia    • Kidney stones      Past Surgical History:   Procedure Laterality Date   • CYSTOSCOPY BLADDER STONE LITHOTRIPSY         Therapy Treatment    Rehabilitation Treatment Summary     Row Name 20 1053 20 0956          Treatment Time/Intention    Discipline  occupational therapist  -SD,GC,SD2  --  -EN     Document Type  therapy note (daily note)  -SD,GC,SD2  --  -EN     Subjective Information  complains of;pain;weakness  -SD,GC,SD2  --  -EN     Mode of Treatment  occupational therapy  -SD,GC,SD2  --  -EN     Patient/Family Observations  Pt supine agreed to therapy  -SD,GC,SD2  --  -EN     Care Plan Review  care plan/treatment goals reviewed;risks/benefits reviewed;current/potential barriers reviewed;patient/other agree to care plan  -SD,GC,SD2  --  -EN     Patient Effort  good  -SD,GC,SD2  --  -EN     Treatment Considerations/Comments  Pt reports chronic decreased ROM/pain in L knee  -SD3  --     Patient Response to Treatment  --  -SD3  --     Recorded by [SD,GC,SD2] Fransico Arroyo, [place] (r) Clemente Martin OT Student (t) Fransico Arroyo, [place] (c) 20 1436  [SD3] Fransico Arroyo OTR 20 1436 [EN] Priyanka Conti OTR 20 1238     Celina  Name 01/20/20 1053 01/20/20 0956          Cognitive Assessment/Intervention- PT/OT    Personal Safety Interventions  gait belt;fall prevention program maintained;nonskid shoes/slippers when out of bed;supervised activity  -SD  gait belt;nonskid shoes/slippers when out of bed  -EN     Recorded by [SD] Fransico Arroyo OTR 01/20/20 1436 [EN] Priyanka Conti OTR 01/20/20 1153     Row Name 01/20/20 1053             Safety Issues, Functional Mobility    Impairments Affecting Function (Mobility)  range of motion (ROM)  -SD,GC,SD2      Comment, Safety Issues/Impairments (Mobility)  Pt stated he has a history of BLE neuropathy and left knee pain/decreased rom which impacts lower body adl's/mobility  -SD3      Recorded by [SD,GC,SD2] Fransico Arroyo OTR (r) Clemente Martin, OT Student (t) Fransico Arroyo OTR (c) 01/20/20 1436  [SD3] Fransico Arroyo OTR 01/20/20 1436      Row Name 01/20/20 1053             Bed Mobility Assessment/Treatment    Bed Mobility Assessment/Treatment  supine-sit  -SD,GC,SD2      Supine-Sit [place] (Bed Mobility)  conditional independence  -SD3      Assistive Device (Bed Mobility)  bed rails  -SD,GC,SD2      Recorded by [SD,GC,SD2] Fransico Arroyo OTR (r) Clemente Martin, OT Student (t) Fransico Arroyo OTR (c) 01/20/20 1436  [SD3] Fransico Arroyo OTR 01/20/20 1436      Row Name 01/20/20 1053             Functional Mobility    Functional Mobility- Ind. Level  contact guard assist  -SD,GC,SD2      Functional Mobility- Device  rolling walker  -SD,GC,SD2      Functional Mobility-Distance (Feet)  20  -SD,GC,SD2      Functional Mobility- Safety Issues  step length decreased;weight-shifting ability decreased  -SD,GC,SD2      Functional Mobility- Comment  pt with history of BLE neuropathy and left knee pain/decreased rom both factors may impact safety with mobility  -SD3      Recorded by [SD,GC,SD2] Fransico Arroyo OTR (r) Clemente Martin OT Student (t) Fransico Arroyo, OTR  (c) 01/20/20 1436  [SD3] Fransico Arroyo OTR 01/20/20 1436      Row Name 01/20/20 1053             Transfer Assessment/Treatment    Transfer Assessment/Treatment  sit-stand transfer;stand-sit transfer  -SD,GC,SD2      Recorded by [SD,GC,SD2] Fransico Arroyo OTR (r) Clemente Martin OT Student (t) Fransico Arroyo OTR (c) 01/20/20 1436      Row Name 01/20/20 1053             Sit-Stand Transfer    Sit-Stand [place] (Transfers)  stand by assist  -SD,GC,SD2      Assistive Device (Sit-Stand Transfers)  walker, front-wheeled  -SD,GC,SD2      Recorded by [SD,GC,SD2] Fransico Arroyo OTR (r) Clemente Martin OT Student (t) Fransico Arroyo OTLAURIE (c) 01/20/20 1436      Row Name 01/20/20 1053             Stand-Sit Transfer    Stand-Sit [place] (Transfers)  stand by assist  -SD,GC,SD2      Assistive Device (Stand-Sit Transfers)  walker, front-wheeled  -SD,GC,SD2      Recorded by [SD,GC,SD2] Fransico Arroyo OTR (r) Clemente Martin OT Student (t) Fransico Arroyo OTR (c) 01/20/20 1436      Row Name 01/20/20 1053             Lower Body Dressing Assessment/Training    Lower Body Dressing [place] Level  doff;don;shoes/slippers;socks;minimum assist (75% patient effort);independent independent with RLE, min assist for LLE  -SD      Lower Body Dressing Position  unsupported sitting  -SD2,GC,SD3      Comment (Lower Body Dressing)  Pt able to don/doff sock/slippers on R LE independently. L LE required min A due to chronic limited ROM and pain in left knee. Pain from catheter also limited LB dressing of L LE.  -SD      Recorded by [SD] Fransico Arroyo OTLAURIE 01/20/20 1436  [SD2,GC,SD3] Fransico Arroyo OTR (r) Clemente Martin OT Student (t) Fransico Arroyo, OTR (c) 01/20/20 1436      Row Name 01/20/20 1053             BADL Safety/Performance    Impairments, BADL Safety/Performance  pain;range of motion;balance  -SD      Skilled BADL Treatment/Intervention  compensatory training;adaptive equipment training  Pt reported willingness to utilize AE (not receptive at eval  -SD      Recorded by [SD] Fransico Arroyo OTR 01/20/20 1436      Row Name 01/20/20 1053             Positioning and Restraints    Pre-Treatment Position  in bed  -SD,GC,SD2      Post Treatment Position  chair  -SD,GC,SD2      In Chair  call light within reach;encouraged to call for assist  -SD,GC,SD2      Recorded by [SD,GC,SD2] Fransico Arroyo OTR (r) Clemente Martin, OT Student (t) Fransico Arroyo OTR (c) 01/20/20 1436      Row Name 01/20/20 1053             Pain Scale: Numbers Pre/Post-Treatment    Pain Scale: Numbers, Pretreatment  0/10 - no pain  -SD,GC,SD2      Pain Scale: Numbers, Post-Treatment  0/10 - no pain  -SD,CHERISE,SD2      Pre/Post Treatment Pain Comment  Pt reported pain in left knee when performing LB dressing task.  -SD,GC,SD2      Recorded by [SD,GC,SD2] Fransico Arroyo OTR (r) Clemente Martin, OT Student (t) Fransico Arroyo OTR (c) 01/20/20 1436      Row Name                Wound 01/16/20 2000 Right posterior elbow Abrasion    Wound - Properties Group Date first assessed: 01/16/20 [RS] Time first assessed: 2000 [RS] Side: Right [RS] Orientation: posterior [RS] Location: elbow [RS] Primary Wound Type: Abrasion [RS] Recorded by:  [RS] Sindy Henson RN 01/17/20 0346    Row Name 01/20/20 1053             Plan of Care Review    Plan of Care Reviewed With  patient  -SD,GC,SD2      Progress  improving  -SD,GC,SD2      Outcome Summary  OT: Pt performed transfers and mobility with CGA;however pt has a history of BLE neuropathy which increases his falls risk. Pt able to don/doff sock/slippers on R LE independently, yet he required min assist for L LE due to chronic limited ROM and c/o pain in left knee. Pt reports he has support at home if needed for adl's, yet he was receptive to further education with compensatory strategies/use of adaptive equipment to allow for increased adl independence.   -SD3      Recorded by [SD,GC,SD2]  Fransico Arroyo, OTR (r) Clemente Martin OT Student (t) Fransico Arroyo, OTR (c) 01/20/20 1436  [SD3] Fransico Arroyo, OTR 01/20/20 1436      Row Name 01/20/20 1053             Outcome Summary/Treatment Plan (OT)    Daily Summary of Progress (OT)  progress toward functional goals is good  -SD,GC,SD2      Plan for Continued Treatment (OT)  continue with POC  -SD,GC,SD2      Recorded by [SD,GC,SD2] Fransico Arroyo, OTR (r) Clemente Martin, OT Student (t) Fransico Arroyo, OTR (c) 01/20/20 1436        User Key  (r) = Recorded By, (t) = Taken By, (c) = Cosigned By    Initials Name Effective Dates Discipline    EN Priyanka Conti OTR 06/22/16 -  OT    Fransico Govea OTR 06/22/16 -  OT    Sindy Kimbrough RN 04/28/17 -  Nurse    Clemente Rocha OT Student 01/08/20 -  OT        Wound 01/16/20 2000 Right posterior elbow Abrasion (Active)   Dressing Appearance dry;intact 1/20/2020 12:09 PM   Closure CINDY 1/20/2020 12:09 PM   Base dressing in place, unable to visualize 1/20/2020 12:09 PM   Drainage Amount none 1/19/2020  9:45 PM           OT Recommendation and Plan  Outcome Summary/Treatment Plan (OT)  Daily Summary of Progress (OT): progress toward functional goals is good  Plan for Continued Treatment (OT): continue with POC  Daily Summary of Progress (OT): progress toward functional goals is good  Plan of Care Review  Plan of Care Reviewed With: patient  Plan of Care Reviewed With: patient  Outcome Summary: OT: Pt performed transfers and mobility with CGA;however pt has a history of BLE neuropathy which increases his falls risk. Pt able to don/doff sock/slippers on R LE independently, yet he required min assist for L LE due to chronic limited ROM and c/o pain in left knee. Pt reports he has support at home if needed for adl's, yet he was receptive to further education with compensatory strategies/use of adaptive equipment to allow for increased adl independence.       Time Calculation:   Time  Calculation- OT     Row Name 01/20/20 1426             Time Calculation- OT    OT Start Time  1030  -SD (r) GC (t) SD (c)      OT Stop Time  1053  -SD (r) GC (t) SD (c)      OT Time Calculation (min)  23 min  -SD (r) GC (t)      TCU Minutes- OT  23 min  -SD (r) GC (t) SD (c)         Timed Charges    91919 - OT Self Care/Mgmt Minutes  23  -SD (r) GC (t) SD (c)        User Key  (r) = Recorded By, (t) = Taken By, (c) = Cosigned By    Initials Name Provider Type    SD Fransico Arroyo OTR Occupational Therapist    GC Clemente Martin, OT Student OT Student        Therapy Charges for Today     Code Description Service Date Service Provider Modifiers Qty    52914018431 HC OT SELF CARE/MGMT/TRAIN EA 15 MIN 1/20/2020 Clemente Martin, OT Student GO 2               Clemente Martin OT Student  1/20/2020

## 2020-01-20 NOTE — PLAN OF CARE
Problem: Patient Care Overview  Goal: Plan of Care Review  Outcome: Ongoing (interventions implemented as appropriate)  Flowsheets  Taken 1/20/2020 1224  Progress: improving  Outcome Summary: Place lobo catheter due to urine retention due to blood clot  Taken 1/20/2020 1209  Plan of Care Reviewed With: patient     Problem: Fall Risk (Adult)  Goal: Identify Related Risk Factors and Signs and Symptoms  Outcome: Ongoing (interventions implemented as appropriate)

## 2020-01-21 PROCEDURE — 99232 SBSQ HOSP IP/OBS MODERATE 35: CPT | Performed by: INTERNAL MEDICINE

## 2020-01-21 PROCEDURE — 97116 GAIT TRAINING THERAPY: CPT

## 2020-01-21 PROCEDURE — 97530 THERAPEUTIC ACTIVITIES: CPT

## 2020-01-21 PROCEDURE — 97535 SELF CARE MNGMENT TRAINING: CPT

## 2020-01-21 RX ADMIN — WHITE PETROLATUM 41 % TOPICAL OINTMENT: OINTMENT at 10:20

## 2020-01-21 RX ADMIN — METOPROLOL SUCCINATE 25 MG: 25 TABLET, EXTENDED RELEASE ORAL at 10:16

## 2020-01-21 RX ADMIN — METOPROLOL SUCCINATE 25 MG: 25 TABLET, EXTENDED RELEASE ORAL at 21:36

## 2020-01-21 RX ADMIN — PANTOPRAZOLE SODIUM 40 MG: 40 TABLET, DELAYED RELEASE ORAL at 17:49

## 2020-01-21 RX ADMIN — PANTOPRAZOLE SODIUM 40 MG: 40 TABLET, DELAYED RELEASE ORAL at 10:16

## 2020-01-21 RX ADMIN — ZOLPIDEM TARTRATE 5 MG: 5 TABLET ORAL at 21:36

## 2020-01-21 RX ADMIN — RAMIPRIL 10 MG: 2.5 CAPSULE ORAL at 10:16

## 2020-01-21 RX ADMIN — SIMVASTATIN 40 MG: 40 TABLET, FILM COATED ORAL at 21:37

## 2020-01-21 RX ADMIN — WHITE PETROLATUM 41 % TOPICAL OINTMENT: OINTMENT at 21:36

## 2020-01-21 RX ADMIN — AMLODIPINE BESYLATE 5 MG: 5 TABLET ORAL at 10:16

## 2020-01-21 NOTE — THERAPY TREATMENT NOTE
SNF - Physical Therapy Treatment Note   [facility] Pleitez     Patient Name: Tony Garrett  : 1926  MRN: [id number]  Today's Date: 2020  Onset of Illness/Injury or Date of Surgery: 20  Date of Referral to PT: 20  Referring Physician: Cassius    Admit Date: 2020    Visit Dx:  No diagnosis found.  Patient Active Problem List   Diagnosis   • Abnormal electrocardiogram   • Coronary arteriosclerosis in native artery   • Cardiomyopathy (CMS/HCC)   • Carotid bruit   • Dyspnea on exertion   • Sinus bradycardia   • Fatigue   • Encounter for rehabilitation       Therapy Treatment    Rehabilitation Treatment Summary     Row Name 20 1045 20 1000          Treatment Time/Intention    Discipline  physical therapist  -JV  occupational therapist  (Pended)   -     Document Type  therapy note (daily note)  -  therapy note (daily note)  (Pended)   -     Subjective Information  no complaints  -J  no complaints  (Pended)   -     Mode of Treatment  individual therapy;physical therapy  -  individual therapy  (Pended)   -     Patient/Family Observations  Pt sitting up in chair, reading a book, agreeable to tx.  -JV  Pt supine in bed and stated he needed to go to the bathroom  (Pended)   -     Care Plan Review  care plan/treatment goals reviewed;risks/benefits reviewed;patient/other agree to care plan  -  care plan/treatment goals reviewed;risks/benefits reviewed;current/potential barriers reviewed;patient/other agree to care plan  (Pended)   -     Patient Effort  good  -JV  good  (Pended)   -     Comment  Pt motivated to participate and requested therapy again this afternoon.  -J  Pt motivated to participate  (Pended)   -     Treatment Considerations/Comments  --  Pt reports decresed pain from catheter and in L knee  (Pended)   -     Patient Response to Treatment  --  Pt stated he is thankful for treatment.   (Pended)   -GC     Recorded by [JV] Bridgette Vela, PT 20 8730  [GC] Clemente Martin, OT Student 01/21/20 1025     Row Name 01/21/20 1000             Cognitive Assessment/Intervention- PT/OT    Personal Safety Interventions  gait belt;nonskid shoes/slippers when out of bed;fall prevention program maintained  (Pended)   -GC      Recorded by [] Clemente Martin, OT Student 01/21/20 1125      Row Name 01/21/20 1000             Safety Issues, Functional Mobility    Safety Issues Affecting Function (Mobility)  awareness of need for assistance  (Pended)   -      Impairments Affecting Function (Mobility)  balance;pain;range of motion (ROM)  (Pended)   -      Comment, Safety Issues/Impairments (Mobility)  Pt often states he does not need gait belt or aissistance  (Pended)   -GC      Recorded by [] Clemente Martin, OT Student 01/21/20 1025      Row Name 01/21/20 1000             Bed Mobility Assessment/Treatment    Bed Mobility Assessment/Treatment  supine-sit  (Pended)   -      Supine-Sit [place] (Bed Mobility)  conditional independence  (Pended)   -      Assistive Device (Bed Mobility)  bed rails;head of bed elevated  (Pended)   -      Comment (Bed Mobility)  Pt able to perform a supine-sit transfer using bed rails  (Pended)   -GC      Recorded by [] Clemente Martin, [employer] Student 01/21/20 1025      Row Name 01/21/20 1000             Functional Mobility    Functional Mobility- Ind. Level  standby assist  (Pended)   -      Functional Mobility- Device  rolling walker  (Pended)   -      Functional Mobility-Distance (Feet)  25  (Pended)   -      Functional Mobility- Safety Issues  balance decreased during turns  (Pended)   -GC      Recorded by [] Clemente Martin, OT Student 01/21/20 1025      Row Name 01/21/20 1045 01/21/20 1000          Transfer Assessment/Treatment    Transfer Assessment/Treatment  sit-stand transfer  -JV  sit-stand transfer  (Pended)   -     Comment (Transfers)  --  Pt feet slip during transfers even with slippers/no-skid socks on  (Pended)   -      Recorded by [JV] Bridgette Vela, PT 01/21/20 1335 [GC] Clemente Martin, OT Student 01/21/20 1025     Row Name 01/21/20 1045 01/21/20 1000          Sit-Stand Transfer    Sit-Stand [place] (Transfers)  contact guard  -JV  minimum assist (75% patient effort)  (Pended)   -GC     Assistive Device (Sit-Stand Transfers)  walker, front-wheeled  -JV  walker, front-wheeled  (Pended)   -GC     Recorded by [JV] Bridgette Vela, PT 01/21/20 1335 [GC] Clemente Martin, OT Student 01/21/20 1025     Row Name 01/21/20 1045 01/21/20 1000          Stand-Sit Transfer    Stand-Sit [place] (Transfers)  stand by assist  -JV  stand by assist  (Pended)   -GC     Assistive Device (Stand-Sit Transfers)  walker, front-wheeled  -JV  walker, front-wheeled  (Pended)   -GC     Recorded by [JV] Bridgette Vela, PT 01/21/20 1335 [GC] Clemente Martin, OT Student 01/21/20 1025     Row Name 01/21/20 1045             Gait/Stairs Assessment/Training    [place] Level (Gait)  contact guard;verbal cues  -JV      Assistive Device (Gait)  walker, front-wheeled  -JV      Distance in Feet (Gait)  125 x 2  -JV      Pattern (Gait)  step-through  -JV      Deviations/Abnormal Patterns (Gait)  base of support, narrow;shantal decreased  -JV      Bilateral Gait Deviations  foot drop/toe drag;forward flexed posture  -JV      [place] Level (Stairs)  contact guard  -JV      Handrail Location (Stairs)  both sides  -JV      Number of Steps (Stairs)  6  -JV      Ascending Technique (Stairs)  step-over-step  -JV      Descending Technique (Stairs)  step-over-step  -JV      Stairs, Safety Issues  balance decreased during turns  -JV      Recorded by [JV] Bridgette Vela, PT 01/21/20 1335      Row Name 01/21/20 1000 01/21/20 0940          Lower Body Dressing Assessment/Training    Lower Body Dressing [place] Level  don;doff;shoes/slippers;socks;minimum assist (75% patient effort);set up;independent  (Pended)   -GC  --  (Pended)  independent with RLE, min  assist for LLE  -GC     Lower Body Dressing Position  unsupported sitting  (Pended)   -GC  --     Comment (Lower Body Dressing)  Pt intialy able to don/doff sock/slippers on R LE independently. L LE required min A due to chronic limited ROM and pain in left knee. After toileting activities pt was able to don/doff tennis shoes independently with setup.  (Pended)   -GC  --     Recorded by [GC] Clemente Martin, OT Student 01/21/20 1039 [GC] Clemente Martin, OT Student 01/21/20 1039     Row Name 01/21/20 1000             Toileting Assessment/Training    [place] Level (Toileting)  supervision;adjust/manage clothing  (Pended)   -      Assistive Devices (Toileting)  commode, 3-in-1  (Pended)  Walker front wheeled  -GC      Toileting Position  supported sitting  (Pended)   -GC      Comment (Toileting)  Pt requires supervision for toileting, but requires min A for LB dressing after toileting.  (Pended)   -GC      Recorded by [GC] Clemente Martin, OT Student 01/21/20 1039      Row Name 01/21/20 1000             BADL Safety/Performance    Impairments, BADL Safety/Performance  pain;range of motion  (Pended)   -GC      Skilled BADL Treatment/Intervention  compensatory training  (Pended)   -GC2      Progress in BADL Status  improvement noted  (Pended)   -GC2      Recorded by [GC] Clemente Martin, OT Student 01/21/20 1103  [GC2] Clemente Martin, OT Student 01/21/20 1039      Row Name 01/21/20 1045 01/21/20 1000          Positioning and Restraints    Pre-Treatment Position  sitting in chair/recliner  -JV  in bed  (Pended)   -GC     Post Treatment Position  chair  -JV  chair  (Pended)   -GC     In Chair  call light within reach;encouraged to call for assist  -JV  call light within reach;encouraged to call for assist  (Pended)   -GC     Recorded by [JV] Bridgette Vela, PT 01/21/20 1335 [GC] Clemente Martin, OT Student 01/21/20 1103     Row Name 01/21/20 1045 01/21/20 1000          Pain Scale: Numbers Pre/Post-Treatment    Pain  Scale: Numbers, Pretreatment  0/10 - no pain  -JV  0/10 - no pain  (Pended)   -     Pain Scale: Numbers, Post-Treatment  0/10 - no pain  -JV  0/10 - no pain  (Pended)   -     Pre/Post Treatment Pain Comment  --  Pt reported his pain from catheter and in the L knee has decresed while performing LB adls  (Pended)   -GC     Recorded by [JV] Bridgette Vela, PT 01/21/20 1335 [GC] Clemente Martin, OT Student 01/21/20 1103     Row Name                Wound 01/16/20 2000 Right posterior elbow Abrasion    Wound - Properties Group Date first assessed: 01/16/20 [RS] Time first assessed: 2000 [RS] Side: Right [RS] Orientation: posterior [RS] Location: elbow [RS] Primary Wound Type: Abrasion [RS] Recorded by:  [RS] Sindy Henson RN 01/17/20 0346    Row Name 01/21/20 1045             Coping    Observed Emotional State  accepting  -JV      Recorded by [JV] Bridgette Vela, PT 01/21/20 1335      Row Name 01/21/20 1045 01/21/20 1000          Plan of Care Review    Plan of Care Reviewed With  patient  -JV  patient  (Pended)   -     Progress  improving  -JV  improving  (Pended)   -     Outcome Summary  Pt was awake/alert and sitting up in chair, agreeable to tx session. Pt was wearing pajama pants and shoes, and was eager to go to rehab gym. Pt transferred sit-stand with CGA and amb 150' with RW and CGA. Pt sat in chair briefly and states that he has been doing LE ther ex on his own. He has stairs at all entries to his home and went up/down stairs in rehab gym twice. He was able to go up/down with reciprocal pattern holding Aries  HR, one episode of min LOB noted when turning around at the bottom of the stairs. Pt rested, then transfered sit-stand from chair with SBA and amb back to room with RW/CGA. Pt left sitting up in chair with legs elevated, call light in reach. Pt jackson tx well, appears to have increasing I with transfers and distance of amb. Pt did request to have more therapy this afternoon. Plan to cont tx with  emphasis on safety and I with ambulation/stairs.  -JV  Pt intialy able to don/doff sock/slippers on R LE independently. L LE required min A due to chronic limited ROM and pain in left knee. After toileting activities pt was able to don/doff tennis shoes independently with setup. Pt reported his pain from catheter and in the L knee has decresed while performing LB adls. Pt stated that his new 3 in 1 commode is more functional, and that is the type he would need when returning home.   (Pended)   -GC     Recorded by [JV] Bridgette Vela, PT 01/21/20 1335 [GC] Clemente Martin, OT Student 01/21/20 1103     Row Name 01/21/20 1000             Outcome Summary/Treatment Plan (OT)    Daily Summary of Progress (OT)  progress toward functional goals is good  (Pended)   -GC      Plan for Continued Treatment (OT)  continue with POC  (Pended)   -GC      Recorded by [GC] Clemente Martin, OT Student 01/21/20 1103      Row Name 01/21/20 1045             Outcome Summary/Treatment Plan (PT)    Daily Summary of Progress (PT)  progress toward functional goals is good  -JV      Recorded by [JV] Bridgette Vela, PT 01/21/20 1335        User Key  (r) = Recorded By, (t) = Taken By, (c) = Cosigned By    Initials Name Effective Dates Discipline    RS Sindy Henson, RN 04/28/17 -  Nurse    Bridgette Pappas, PT 09/30/19 -  PT    GC Clemente Martin, OT Student 01/08/20 -  OT          Wound 01/16/20 2000 Right posterior elbow Abrasion (Active)   Dressing Appearance open to air 1/20/2020 11:00 PM   Base scab 1/20/2020 11:00 PM   Drainage Amount none 1/20/2020 11:00 PM               PT Recommendation and Plan     Outcome Summary/Treatment Plan (PT)  Daily Summary of Progress (PT): progress toward functional goals is good  Plan of Care Reviewed With: patient  Progress: improving  Outcome Summary: Pt was awake/alert and sitting up in chair, agreeable to tx session. Pt was wearing pajama pants and shoes, and was eager to go to rehab gym. Pt  transferred sit-stand with CGA and amb 150' with RW and CGA. Pt sat in chair briefly and states that he has been doing LE ther ex on his own. He has stairs at all entries to his home and went up/down stairs in rehab gym twice. He was able to go up/down with reciprocal pattern holding Aries  HR, one episode of min LOB noted when turning around at the bottom of the stairs. Pt rested, then transfered sit-stand from chair with SBA and amb back to room with RW/CGA. Pt left sitting up in chair with legs elevated, call light in reach. Pt jackson tx well, appears to have increasing I with transfers and distance of amb. Pt did request to have more therapy this afternoon. Plan to cont tx with emphasis on safety and I with ambulation/stairs.     Time Calculation:   PT Charges     Row Name 01/21/20 1337             Time Calculation    Start Time  1045  -JV      Stop Time  1110  -JV      Time Calculation (min)  25 min  -JV         Timed Charges    47609 - Gait Training Minutes   15  -JV      95160 - PT Therapeutic Activity Minutes  10  -JV        User Key  (r) = Recorded By, (t) = Taken By, (c) = Cosigned By    Initials Name Provider Type    Bridgette Pappas, DANETTE Physical Therapist        Therapy Charges for Today     Code Description Service Date Service Provider Modifiers Qty    00759650290 HC GAIT TRAINING EA 15 MIN 1/21/2020 Bridgette Vela PT GP 1    84537064334 HC PT THERAPEUTIC ACT EA 15 MIN 1/21/2020 Bridgette Vela PT GP 1               Bridgette Vela PT  1/21/2020

## 2020-01-21 NOTE — PLAN OF CARE
Problem: Patient Care Overview  Goal: Plan of Care Review  Outcome: Ongoing (interventions implemented as appropriate)     Problem: Fall Risk (Adult)  Goal: Identify Related Risk Factors and Signs and Symptoms  Outcome: Ongoing (interventions implemented as appropriate)     Problem: Skin Injury Risk (Adult)  Goal: Identify Related Risk Factors and Signs and Symptoms  Outcome: Ongoing (interventions implemented as appropriate)

## 2020-01-21 NOTE — THERAPY TREATMENT NOTE
SNF - Occupational Therapy Treatment Note  ANGELICA Almanzar     Patient Name: Tony Garrett  : 1926  MRN: [id number]  Today's Date: 2020  Onset of Illness/Injury or Date of Surgery: 20  Date of Referral to OT: 20  Referring Physician: Cassius    Admit Date: 2020     No diagnosis found.  Patient Active Problem List   Diagnosis   • Abnormal electrocardiogram   • Coronary arteriosclerosis in native artery   • Cardiomyopathy (CMS/HCC)   • Carotid bruit   • Dyspnea on exertion   • Sinus bradycardia   • Fatigue   • Encounter for rehabilitation     Past Medical History:   Diagnosis Date   • Atrial fibrillation, chronic    • Chronic kidney disease (CKD), stage III (moderate) (CMS/HCC)    • HTN (hypertension)    • Hyperlipidemia    • Kidney stones      Past Surgical History:   Procedure Laterality Date   • CYSTOSCOPY BLADDER STONE LITHOTRIPSY         Therapy Treatment    Rehabilitation Treatment Summary     Row Name 20 1045 20 1000          Treatment Time/Intention    Discipline  physical therapist  -JV  occupational therapist  -SD,GC,SD2     Document Type  therapy note (daily note)  -JV  therapy note (daily note)  -SD,GC,SD2     Subjective Information  no complaints  -JV  no complaints  -SD,GC,SD2     Mode of Treatment  individual therapy;physical therapy  -JV  individual therapy  -SD,GC,SD2     Patient/Family Observations  Pt sitting up in chair, reading a book, agreeable to tx.  -JV  Pt supine in bed and stated he needed to go to the bathroom  -SD,GC,SD2     Care Plan Review  care plan/treatment goals reviewed;risks/benefits reviewed;patient/other agree to care plan  -JV  care plan/treatment goals reviewed;risks/benefits reviewed;current/potential barriers reviewed;patient/other agree to care plan  -SD,GC,SD2     Patient Effort  good  -JV  good  -SD,GC,SD2     Comment  Pt motivated to participate and requested therapy again this afternoon.  -JV  Pt motivated to participate   -SD,GC,SD2     Treatment Considerations/Comments  --  Pt reports decresed pain from catheter and in L knee today  -SD3     Patient Response to Treatment  --  Pt stated he is thankful for treatment.   -SD,GC,SD2     Recorded by [JV] Bridgette Vela, PT 01/21/20 1335 [SD,GC,SD2] Fransico Arroyo OTR (r) Clemente Martin OT Student (t) Fransico Arroyo OTR (c) 01/21/20 1556  [SD3] Fransico Arroyo OTR 01/21/20 1555     Row Name 01/21/20 1000             Cognitive Assessment/Intervention- PT/OT    Personal Safety Interventions  gait belt;nonskid shoes/slippers when out of bed;fall prevention program maintained  -SD,GC,SD2      Recorded by [SD,GC,SD2] Fransico Arroyo OTR (r) Clemente Martin OT Student (t) Fransico Arroyo OTR (c) 01/21/20 1556      Row Name 01/21/20 1000             Safety Issues, Functional Mobility    Safety Issues Affecting Function (Mobility)  awareness of need for assistance  -SD,GC,SD2      Impairments Affecting Function (Mobility)  balance;pain;range of motion (ROM)  -SD,GC,SD2      Comment, Safety Issues/Impairments (Mobility)  Pt often states he does not need gait belt or aissistance  -SD,GC,SD2      Recorded by [SD,GC,SD2] Fransico Arroyo OTR (r) Clemente Martin OT Student (t) Fransico Arroyo OTR (c) 01/21/20 1556      Row Name 01/21/20 1000             Bed Mobility Assessment/Treatment    Bed Mobility Assessment/Treatment  supine-sit  -SD,GC,SD2      Supine-Sit [place] (Bed Mobility)  conditional independence  -SD,GC,SD2      Assistive Device (Bed Mobility)  bed rails;head of bed elevated  -SD,GC,SD2      Comment (Bed Mobility)  Pt able to perform a supine-sit transfer using bed rails  -SD,GC,SD2      Recorded by [SD,GC,SD2] Fransico Arroyo OTR (r) Mario, Gesta, OT Student (t) Fransico Arroyo OTR (c) 01/21/20 1556      Row Name 01/21/20 1000             Functional Mobility    Functional Mobility- Ind. Level  standby assist  -SD,GC,SD2      Functional Mobility-  Device  rolling walker  -SD,GC,SD2      Functional Mobility-Distance (Feet)  25  -SD,GC,SD2      Functional Mobility- Safety Issues  balance decreased during turns  -SD,GC,SD2      Recorded by [SD,GC,SD2] Fransico Arroyo OTR (r) Clemente Martin OT Student (t) Fransico Arroyo OTR (c) 01/21/20 1556      Row Name 01/21/20 1045 01/21/20 1000          Transfer Assessment/Treatment    Transfer Assessment/Treatment  sit-stand transfer  -JV  sit-stand transfer  -SD,GC,SD2     Comment (Transfers)  --  Pt feet slip during transfers even with slippers/no-skid socks. Rec tennis shoes for safety.  -SD3     Recorded by [JV] Bridgette Vela, PT 01/21/20 1335 [SD,GC,SD2] Fransico Arryoo OTR (r) Clemente Martin, OS[employer] Student (t) Fransico Arroyo OTR (c) 01/21/20 1556  [SD3] Fransico Arroyo OTR 01/21/20 1555     Row Name 01/21/20 1045 01/21/20 1000          Sit-Stand Transfer    Sit-Stand [place] (Transfers)  contact guard  -JV  minimum assist (75% patient effort)  -SD,GC,SD2     Assistive Device (Sit-Stand Transfers)  walker, front-wheeled  -JV  walker, front-wheeled  -SD,GC,SD2     Recorded by [JV] Bridgette Vela, PT 01/21/20 1335 [SD,GC,SD2] Fransico Arroyo OTR (r) Clemente Martin OT Student (t) Fransico Arroyo OTR (c) 01/21/20 1556     Row Name 01/21/20 1045 01/21/20 1000          Stand-Sit Transfer    Stand-Sit [place] (Transfers)  stand by assist  -JV  stand by assist  -SD,GC,SD2     Assistive Device (Stand-Sit Transfers)  walker, front-wheeled  -JV  walker, front-wheeled  -SD,GC,SD2     Recorded by [JV] Bridgette Vela, PT 01/21/20 1335 [SD,GC,SD2] Fransico Arroyo OTR (r) Clemente Martin OT Student (t) Fransico Arroyo OTR (c) 01/21/20 1556     Row Name 01/21/20 1045             Gait/Stairs Assessment/Training    [place] Level (Gait)  contact guard;verbal cues  -JV      Assistive Device (Gait)  walker, front-wheeled  -JV      Distance in Feet (Gait)  125 x 2  -JV      Pattern (Gait)   step-through  -JV      Deviations/Abnormal Patterns (Gait)  base of support, narrow;shantal decreased  -JV      Bilateral Gait Deviations  foot drop/toe drag;forward flexed posture  -JV      [place] Level (Stairs)  contact guard  -JV      Handrail Location (Stairs)  both sides  -JV      Number of Steps (Stairs)  6  -JV      Ascending Technique (Stairs)  step-over-step  -JV      Descending Technique (Stairs)  step-over-step  -JV      Stairs, Safety Issues  balance decreased during turns  -JV      Recorded by [JV] Bridgette Vela, PT 01/21/20 1335      Row Name 01/21/20 1000 01/21/20 0940          Lower Body Dressing Assessment/Training    Lower Body Dressing [place] Level  don;doff;shoes/slippers;socks;minimum assist (75% patient effort);set up;independent;pants/bottoms  -SD  -- independent with RLE, min assist for LLE  -SD,GC,SD2     Lower Body Dressing Position  unsupported sitting  -SD2,GC,SD3  --     Comment (Lower Body Dressing)  Pt intially able to don/doff sock/slippers on R LE independently. L LE required min A due to chronic limited ROM and pain in left knee. After toileting activities pt was able to don/doff tennis shoes independently with setup. Pt required min assist to don pants due to the catheter. Will continue with education.  -SD  --     Recorded by [SD] Fransico Arroyo OTR 01/21/20 1555  [SD2,GC,SD3] Fransico Arroyo OTR (r) Clemente Martin OT Student (t) Fransico Arroyo OTR (c) 01/21/20 1556 [SD,GC,SD2] Fransico Arroyo OTR (r) Clemente Martin OT Student (t) Fransico Arroyo OTR (c) 01/21/20 1556     Row Name 01/21/20 1000             Toileting Assessment/Training    [place] Level (Toileting)  supervision;adjust/manage clothing  -SD,GC,SD2      Assistive Devices (Toileting)  commode, 3-in-1 Walker front wheeled  -SD,GC,SD2      Toileting Position  supported sitting  -CHERISE RASMUSSEN SD2      Comment (Toileting)  Pt requires supervision for toileting  -SD3      Recorded by  [SD,GC,SD2] Fransico Arroyo, OTR (r) Clemente Martin, OT Student (t) Fransico Arroyo, OTR (c) 01/21/20 1556  [SD3] Fransico Arroyo, OTR 01/21/20 1555      Row Name 01/21/20 1000             BADL Safety/Performance    Impairments, BADL Safety/Performance  pain;range of motion  -SD,GC,SD2      Skilled BADL Treatment/Intervention  compensatory training  -SD,GC,SD2      Progress in BADL Status  improvement noted  -SD,GC,SD2      Recorded by [SD,GC,SD2] Fransico Arroyo, OTR (r) Clemente Martin, OT Student (t) Fransico Arroyo, OTR (c) 01/21/20 1556      Row Name 01/21/20 1045 01/21/20 1000          Positioning and Restraints    Pre-Treatment Position  sitting in chair/recliner  -JV  in bed  -SD,GC,SD2     Post Treatment Position  chair  -JV  chair  -SD,GC,SD2     In Chair  call light within reach;encouraged to call for assist  -JV  call light within reach;encouraged to call for assist  -SD,GC,SD2     Recorded by [JV] Bridgette Vela, PT 01/21/20 1335 [SD,GC,SD2] Fransico Arroyo, OTR (r) Clemente Martin, OT Student (t) Fransico Arroyo, OTR (c) 01/21/20 1556     Row Name 01/21/20 1045 01/21/20 1000          Pain Scale: Numbers Pre/Post-Treatment    Pain Scale: Numbers, Pretreatment  0/10 - no pain  -JV  0/10 - no pain  -SD,GC,SD2     Pain Scale: Numbers, Post-Treatment  0/10 - no pain  -JV  0/10 - no pain  -SD,GC,SD2     Pre/Post Treatment Pain Comment  --  Pt reported his pain from catheter and in the L knee has decresed while performing LB adls  -SD,GC,SD2     Recorded by [JV] Bridgette Vela, PT 01/21/20 1335 [SD,GC,SD2] Fransico Arroyo, OTR (r) Clemente Martin OT Student (t) Fransico Arroyo OTR (c) 01/21/20 1556     Row Name                Wound 01/16/20 2000 Right posterior elbow Abrasion    Wound - Properties Group Date first assessed: 01/16/20 [RS] Time first assessed: 2000 [RS] Side: Right [RS] Orientation: posterior [RS] Location: elbow [RS] Primary Wound Type: Abrasion [RS] Recorded by:  [RS]  Sindy Henson RN 01/17/20 0346    Row Name 01/21/20 1045             Coping    Observed Emotional State  accepting  -JV      Recorded by [JV] Bridgette Vela, PT 01/21/20 1335      Row Name 01/21/20 1045 01/21/20 1000          Plan of Care Review    Plan of Care Reviewed With  patient  -JV  patient  -SD,GC,SD2     Progress  improving  -JV  improving  -SD,GC,SD2     Outcome Summary  Pt was awake/alert and sitting up in chair, agreeable to tx session. Pt was wearing pajama pants and shoes, and was eager to go to rehab gym. Pt transferred sit-stand with CGA and amb 150' with RW and CGA. Pt sat in chair briefly and states that he has been doing LE ther ex on his own. He has stairs at all entries to his home and went up/down stairs in rehab gym twice. He was able to go up/down with reciprocal pattern holding Aries  HR, one episode of min LOB noted when turning around at the bottom of the stairs. Pt rested, then transfered sit-stand from chair with SBA and amb back to room with RW/CGA. Pt left sitting up in chair with legs elevated, call light in reach. Pt jackson tx well, appears to have increasing I with transfers and distance of amb. Pt did request to have more therapy this afternoon. Plan to cont tx with emphasis on safety and I with ambulation/stairs.  -JV  OT:  Pt with improved functional independence with adl tasks today. Pt reported decreased pain overall. Pt continues to require min assist for lower body adl's due to catheter.   -SD3     Recorded by [JV] Bridgette Vela, PT 01/21/20 1335 [SD,GC,SD2] Fransico Arroyo, OTR (r) Clemente Martin OT Student (t) Fransico Arroyo, OTR (c) 01/21/20 1556  [SD3] Fransico Arroyo OTR 01/21/20 1555     Row Name 01/21/20 1000             Outcome Summary/Treatment Plan (OT)    Daily Summary of Progress (OT)  progress toward functional goals is good  -SD,GC,SD2      Plan for Continued Treatment (OT)  continue with POC  -SD,GC,SD2      Recorded by [SD,GC,SD2] Fransico Arroyo,  OTR (r) Clemente Martin, OT Student (t) Fransico Arroyo, OTR (c) 01/21/20 1556      Row Name 01/21/20 1045             Outcome Summary/Treatment Plan (PT)    Daily Summary of Progress (PT)  progress toward functional goals is good  -JV      Recorded by [JV] Bridgette Vela, PT 01/21/20 1335        User Key  (r) = Recorded By, (t) = Taken By, (c) = Cosigned By    Initials Name Effective Dates Discipline    SD Fransico Arroyo, OTR 06/22/16 -  OT    RS Sindy Henson, RN 04/28/17 -  Nurse    Bridgette Pappas, PT 09/30/19 -  PT    GC Clemente Martin, [employer] Student 01/08/20 -  OT        Wound 01/16/20 2000 Right posterior elbow Abrasion (Active)   Dressing Appearance open to air 1/20/2020 11:00 PM   Base scab 1/20/2020 11:00 PM   Drainage Amount none 1/20/2020 11:00 PM           OT Recommendation and Plan  Outcome Summary/Treatment Plan (OT)  Daily Summary of Progress (OT): progress toward functional goals is good  Plan for Continued Treatment (OT): continue with POC  Daily Summary of Progress (OT): progress toward functional goals is good  Plan of Care Review  Plan of Care Reviewed With: patient  Plan of Care Reviewed With: patient  Outcome Summary: OT: Pt performed transfers and mobility with CGA;however pt has a history of BLE neuropathy which increases his falls risk. Pt able to don/doff sock/slippers on R LE independently, yet he required min assist for L LE due to chronic limited ROM and c/o pain in left knee. Pt reports he has support at home if needed for adl's, yet he was receptive to further education with compensatory strategies/use of adaptive equipment to allow for increased adl independence.       Time Calculation:   Time Calculation- OT     Row Name 01/21/20 1337 01/21/20 1110          Time Calculation- OT    OT Start Time  --  0925  -SD (r) GC (t) SD (c)     OT Stop Time  --  1002  -SD (r) GC (t) SD (c)     OT Time Calculation (min)  --  37 min  -SD (r) GC (t)     TCU Minutes- OT  --  37 min  -SD (r)  GC (t) SD (c)        Timed Charges    24024 - Gait Training Minutes   15  -JV  --     15906 - OT Self Care/Mgmt Minutes  --  37  -SD (r) GC (t) SD (c)       User Key  (r) = Recorded By, (t) = Taken By, (c) = Cosigned By    Initials Name Provider Type    SD Fransico Arroyo, OTR Occupational Therapist    Bridgette Pappas, PT Physical Therapist    Clemente Rocha, OT Student OT Student        Therapy Charges for Today     Code Description Service Date Service Provider Modifiers Qty    62834393723 HC OT SELF CARE/MGMT/TRAIN EA 15 MIN 1/20/2020 Clemente Martin OT Student GO 2    33563085414 HC OT SELF CARE/MGMT/TRAIN EA 15 MIN 1/21/2020 Clemente Martin OT Student GO 2               Clemente Martin OT Student  1/21/2020

## 2020-01-21 NOTE — PROGRESS NOTES
"Daily Progress Note:      Chief complaint: Follow-up of right ureteric stone status post extraction with postoperative hematuria, atrial fibrillation, hypertension    Subjective: She developed some severe lower abdominal pain and having to have a Lopez catheter placed likely due to obstruction from a clot he has improved and his urine is clearer today than it was yesterday urology has been consulted.  His stool is less dark.     LOS: 5 days     Vital Signs  Temp:  [97.7 °F (36.5 °C)-98.3 °F (36.8 °C)] 97.7 °F (36.5 °C)  Heart Rate:  [66-95] 66  Resp:  [18-20] 18  BP: (127-133)/(60-64) 127/64  Oxygen Therapy  SpO2: 98 %  Pulse Oximetry Type: Intermittent  Device (Oxygen Therapy): room air}  Body mass index is 24.99 kg/m².  Flowsheet Rows      First Filed Value   Admission Height  172.7 cm (68\") Documented at 01/16/2020 1656   Admission Weight  75.7 kg (166 lb 12.8 oz) Documented at 01/16/2020 1656                   Documented weights    01/16/20 1656   Weight: 75.7 kg (166 lb 12.8 oz)           Patient Vitals for the past 24 hrs:   BP Temp Temp src Pulse Resp SpO2 Height Weight   01/20/20 2050 127/64 97.7 °F (36.5 °C) Oral 66 18 98 % -- --   01/20/20 1359 -- -- -- -- -- -- 174 cm (68.5\") --   01/20/20 0936 133/60 98.3 °F (36.8 °C) Oral 95 20 90 % -- --       75.7 kg (166 lb 12.8 oz)      Intake/Output Summary (Last 24 hours) at 1/21/2020 0757  Last data filed at 1/21/2020 0555  Gross per 24 hour   Intake 1200 ml   Output 1225 ml   Net -25 ml       Review of Systems   Constitutional: Positive for activity change and fatigue. Negative for appetite change.   HENT: Negative for congestion.    Respiratory: Negative for cough, chest tightness, shortness of breath and wheezing.    Cardiovascular: Negative for chest pain.   Gastrointestinal: Negative for abdominal distention, abdominal pain, diarrhea, nausea and vomiting.   Endocrine: Negative for polyphagia and polyuria.   Genitourinary: Negative for frequency.   Skin: " Negative for rash.   Neurological: Negative for light-headedness.   Hematological: Does not bruise/bleed easily.   Psychiatric/Behavioral: Negative for agitation and behavioral problems.       Physical Exam   Constitutional: He appears well-developed and well-nourished.   HENT:   Head: Normocephalic.   Mouth/Throat: Oropharynx is clear and moist.   Eyes: Conjunctivae are normal.   Neck: Normal range of motion. No JVD present. No thyromegaly present.   Cardiovascular: Normal rate. An irregularly irregular rhythm present.   Murmur heard.  Pulmonary/Chest: Effort normal and breath sounds normal. No respiratory distress. He has no wheezes. He has no rales.   Abdominal: Soft. Bowel sounds are normal. He exhibits no distension. There is no tenderness. There is no guarding.   Neurological: He is alert.   Skin: Skin is warm and dry. No rash noted.   Nursing note and vitals reviewed.      Medication Review:   I have reviewed the patient's current medication list  Scheduled Meds:    amLODIPine 5 mg Oral Daily   AQUAPHOR ADVANCED THERAPY  Topical Q12H   influenza virus vacc split PF 0.5 mL Intramuscular Once   metoprolol succinate XL 25 mg Oral BID   pantoprazole 40 mg Oral BID AC   ramipril 10 mg Oral Daily   simvastatin 40 mg Oral Daily   zolpidem 5 mg Oral Nightly     Continuous Infusions:   PRN Meds:.•  acetaminophen  •  bisacodyl  •  docusate sodium  •  magnesium hydroxide  •  ondansetron **OR** ondansetron  •  sennosides-docusate      Labs:  Results from last 7 days   Lab Units 01/20/20  0528 01/19/20  0809 01/18/20  2311 01/16/20  0347 01/15/20  1311   WBC 10*3/mm3 6.02 6.26 6.64 4.44*  --    HEMOGLOBIN g/dL 8.5* 8.7* 8.9* 9.5* 9.2*   HEMATOCRIT % 26.2* 27.8* 28.4* 29.5* 27.8*   PLATELETS 10*3/mm3 168 179 177 147  --      Results from last 7 days   Lab Units 01/20/20  0528 01/18/20  2311 01/16/20  0347 01/15/20  1311   SODIUM mmol/L 138 137 137 139   POTASSIUM mmol/L 4.1 4.3 4.3 4.4   CHLORIDE mmol/L 106 104 110* 110*    TOTAL CO2 mmol/L  --   --  19* 20*   CO2 mmol/L 24.5 23.5  --   --    BUN mg/dL 20 23 37* 43*   CREATININE mg/dL 0.94 0.96 1.1 1.2   CALCIUM mg/dL 9.0 8.6 8.6 8.6   GLUCOSE mg/dL 94 99  --   --            Lab Results (last 24 hours)     Procedure Component Value Units Date/Time    Urinalysis, Microscopic Only - Urine, Catheter [[id number]]  (Abnormal) Collected:  01/20/20 1356    Specimen:  Urine, Catheter Updated:  01/20/20 1413     RBC, UA Too Numerous to Count /HPF      WBC, UA 3-5 /HPF      Bacteria, UA Trace /HPF      Squamous Epithelial Cells, UA 0-2 /HPF      Hyaline Casts, UA None Seen /LPF      Methodology Manual Light Microscopy    Urinalysis With Culture If Indicated - Urine, Catheter [[id number]]  (Abnormal) Collected:  01/20/20 1356    Specimen:  Urine, Catheter Updated:  01/20/20 1409     Color, UA Dark Yellow     Appearance, UA Slightly Cloudy     pH, UA <=5.0     Specific Gravity, UA 1.020     Glucose, UA Negative     Ketones, UA Negative     Bilirubin, UA Negative     Blood, UA Large (3+)     Protein,  mg/dL (2+)     Leuk Esterase, UA Negative     Nitrite, UA Negative     Urobilinogen, UA 0.2 E.U./dL                                  Invalid input(s): LDLCALC          No results found for: POCGLU      Results from last 7 days   Lab Units 01/16/20  1710   MRSA SCREEN CX  No Methicillin Resistant Staphylococcus aureus isolated     Results from last 7 days   Lab Units 01/20/20  1356   NITRITE UA  Negative   WBC UA /HPF 3-5*   BACTERIA UA /HPF Trace*   SQUAM EPITHEL UA /HPF 0-2             Radiology:  Imaging Results (Last 24 Hours)     ** No results found for the last 24 hours. **          Cardiology:  ECG/EMG Results (last 24 hours)     ** No results found for the last 24 hours. **          I have reviewed recent labs results and consult notes.  Parts of this note may have been copied and pasted but patient was examined and interviewed by me today    Assessment and Plan:          1.  Generalized  weakness from prolonged recent hospital stay continue with therapies    2.  Permanent atrial fibrillation rate controlled nothing acute continue with home beta-blocker.  Patient was on chronic anticoagulation with Eliquis to be held because of his significant hematuria will be resumed once this is improved     3.  Hypertension well controlled continue home medications     4.  Hyperlipidemia nothing acute we will continue with present statin        5.  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  Recent right ureteric stone status post stone extraction and stent placement recurrent clot with obstruction requiring Lopez catheterization neurology note has been reviewed we will continue with present management    7.  Melena with mild anemia GI has been consulted patient defers any further work-up at this time we will continue to monitor his H&H's     Much of this encounter note is an electronic transcription/translation of spoken language to printed text using Dragon Software

## 2020-01-21 NOTE — PLAN OF CARE
Problem: Patient Care Overview  Goal: Plan of Care Review  Flowsheets (Taken 1/21/2020 1045)  Progress: improving  Plan of Care Reviewed With: patient  Outcome Summary: Pt was awake/alert and sitting up in chair, agreeable to tx session. Pt was wearing pajama pants and shoes, and was eager to go to rehab gym. Pt transferred sit-stand with CGA and amb 150' with RW and CGA. Pt sat in chair briefly and states that he has been doing LE ther ex on his own. He has stairs at all entries to his home and went up/down stairs in rehab gym twice. He was able to go up/down with reciprocal pattern holding Aries  HR, one episode of min LOB noted when turning around at the bottom of the stairs. Pt rested, then transfered sit-stand from chair with SBA and amb back to room with RW/CGA. Pt left sitting up in chair with legs elevated, call light in reach. Pt jackson tx well, appears to have increasing I with transfers and distance of amb. Pt did request to have more therapy this afternoon. Plan to cont tx with emphasis on safety and I with ambulation/stairs.

## 2020-01-21 NOTE — PLAN OF CARE
Problem: Patient Care Overview  Goal: Plan of Care Review  Flowsheets (Taken 1/21/2020 3465)  Outcome Summary: OT:  Pt with improved functional independence with adl tasks today. Pt reported decreased pain overall. Pt continues to require min assist for lower body adl's due to catheter.

## 2020-01-22 LAB
DEPRECATED RDW RBC AUTO: 46.4 FL (ref 37–54)
ERYTHROCYTE [DISTWIDTH] IN BLOOD BY AUTOMATED COUNT: 13.6 % (ref 12.3–15.4)
HCT VFR BLD AUTO: 26.3 % (ref 37.5–51)
HGB BLD-MCNC: 8.4 G/DL (ref 13–17.7)
MCH RBC QN AUTO: 30 PG (ref 26.6–33)
MCHC RBC AUTO-ENTMCNC: 31.9 G/DL (ref 31.5–35.7)
MCV RBC AUTO: 93.9 FL (ref 79–97)
PLATELET # BLD AUTO: 182 10*3/MM3 (ref 140–450)
PMV BLD AUTO: 9.9 FL (ref 6–12)
RBC # BLD AUTO: 2.8 10*6/MM3 (ref 4.14–5.8)
WBC NRBC COR # BLD: 5.3 10*3/MM3 (ref 3.4–10.8)

## 2020-01-22 PROCEDURE — 97535 SELF CARE MNGMENT TRAINING: CPT

## 2020-01-22 PROCEDURE — 97110 THERAPEUTIC EXERCISES: CPT

## 2020-01-22 PROCEDURE — 97530 THERAPEUTIC ACTIVITIES: CPT

## 2020-01-22 PROCEDURE — 85027 COMPLETE CBC AUTOMATED: CPT | Performed by: INTERNAL MEDICINE

## 2020-01-22 PROCEDURE — 97116 GAIT TRAINING THERAPY: CPT

## 2020-01-22 RX ADMIN — WHITE PETROLATUM 41 % TOPICAL OINTMENT: OINTMENT at 21:29

## 2020-01-22 RX ADMIN — RAMIPRIL 10 MG: 2.5 CAPSULE ORAL at 10:30

## 2020-01-22 RX ADMIN — AMLODIPINE BESYLATE 5 MG: 5 TABLET ORAL at 10:30

## 2020-01-22 RX ADMIN — PANTOPRAZOLE SODIUM 40 MG: 40 TABLET, DELAYED RELEASE ORAL at 10:30

## 2020-01-22 RX ADMIN — PANTOPRAZOLE SODIUM 40 MG: 40 TABLET, DELAYED RELEASE ORAL at 18:13

## 2020-01-22 RX ADMIN — SIMVASTATIN 40 MG: 40 TABLET, FILM COATED ORAL at 21:28

## 2020-01-22 RX ADMIN — METOPROLOL SUCCINATE 25 MG: 25 TABLET, EXTENDED RELEASE ORAL at 10:29

## 2020-01-22 RX ADMIN — ZOLPIDEM TARTRATE 5 MG: 5 TABLET ORAL at 21:28

## 2020-01-22 RX ADMIN — METOPROLOL SUCCINATE 25 MG: 25 TABLET, EXTENDED RELEASE ORAL at 21:28

## 2020-01-22 RX ADMIN — WHITE PETROLATUM 41 % TOPICAL OINTMENT: OINTMENT at 10:00

## 2020-01-22 NOTE — PROGRESS NOTES
Gastroenterology Progress Note        Chief Complaint:  Abdominal pain  Subjective     Interval History:     Sitting up in chair eating. No complaints. One bm today and stools are lighter. Lopez still in place, urine looks less bloody  Review of Systems:    ROS  Negative   Objective     Vital Signs  Temp:  [97.3 °F (36.3 °C)-97.7 °F (36.5 °C)] 97.7 °F (36.5 °C)  Heart Rate:  [67-74] 67  Resp:  [16-20] 16  BP: (128-150)/(59-67) 128/59  Body mass index is 24.99 kg/m².    Intake/Output Summary (Last 24 hours) at 1/21/2020 2255  Last data filed at 1/21/2020 1837  Gross per 24 hour   Intake 1080 ml   Output 1075 ml   Net 5 ml     No intake/output data recorded.       Physical Exam:   General: patient awake, alert and cooperative   Eyes: Normal lids and lashes, no scleral icterus   Neck: supple, normal ROM   Skin: warm and dry, not jaundiced   Cardiovascular: regular rhythm and rate, no murmurs auscultated   Pulm: clear to auscultation bilaterally, regular and unlabored   Abdomen: soft, nontender, nondistended; normal bowel sounds   Rectal: deferred   Extremities: no rash or edema   Neurologic: Normal mood and behavior     Results Review:      New results reviewed    WBC No results found for: WBCS   HGB Hemoglobin   Date Value Ref Range Status   01/20/2020 8.5 (L) 13.0 - 17.7 g/dL Final   01/19/2020 8.7 (L) 13.0 - 17.7 g/dL Final   01/18/2020 8.9 (L) 13.0 - 17.7 g/dL Final      HCT Hematocrit   Date Value Ref Range Status   01/20/2020 26.2 (L) 37.5 - 51.0 % Final   01/19/2020 27.8 (L) 37.5 - 51.0 % Final   01/18/2020 28.4 (L) 37.5 - 51.0 % Final      Platlets No results found for: LABPLAT     PT/INR:  No results found for: PROTIME/No results found for: INR    Sodium Sodium   Date Value Ref Range Status   01/20/2020 138 136 - 145 mmol/L Final   01/18/2020 137 136 - 145 mmol/L Final      Potassium Potassium   Date Value Ref Range Status   01/20/2020 4.1 3.5 - 5.2 mmol/L Final   01/18/2020 4.3 3.5 - 5.2 mmol/L Final       Chloride Chloride   Date Value Ref Range Status   01/20/2020 106 98 - 107 mmol/L Final   01/18/2020 104 98 - 107 mmol/L Final      Bicarbonate No results found for: PLASMABICARB   BUN BUN   Date Value Ref Range Status   01/20/2020 20 8 - 23 mg/dL Final   01/18/2020 23 8 - 23 mg/dL Final      Creatinine Creatinine   Date Value Ref Range Status   01/20/2020 0.94 0.76 - 1.27 mg/dL Final   01/18/2020 0.96 0.76 - 1.27 mg/dL Final      Calcium Calcium   Date Value Ref Range Status   01/20/2020 9.0 8.2 - 9.6 mg/dL Final   01/18/2020 8.6 8.2 - 9.6 mg/dL Final      Magnesium  AST  ALT  Bilirubin, Total  AlkPhos  Albumin    Amylase  Lipase    Radiology: No results found for: MG  No components found for: AST.*  No components found for: ALT.*  No components found for: BILIRUBIN, TOTAL.*    No components found for: ALKPHOS.*  No components found for: ALBUMIN.*      No components found for: AMYLASE.*  No components found for: LIPASE.*            Imaging Results (Most Recent)     None                                            Assessment/Plan     GI bleed-possible melena. Melena resolved. On ppi bid. Off anticoagulation. He is not wanting to have any endoscopic evaluation unless he has further melena?  Continue on ppi  Cbc in       Aimee Garcia MD  01/21/20  10:55 PM

## 2020-01-22 NOTE — THERAPY TREATMENT NOTE
SNF - Physical Therapy Treatment Note   [facility]     Patient Name: Tony Garrett  : 1926  MRN: [id number]  Today's Date: 2020  Onset of Illness/Injury or Date of Surgery: 20  Date of Referral to PT: 20  Referring Physician: Cassius    Admit Date: 2020    Visit Dx:  No diagnosis found.  Patient Active Problem List   Diagnosis   • Abnormal electrocardiogram   • Coronary arteriosclerosis in native artery   • Cardiomyopathy (CMS/HCC)   • Carotid bruit   • Dyspnea on exertion   • Sinus bradycardia   • Fatigue   • Encounter for rehabilitation       Therapy Treatment    Rehabilitation Treatment Summary     Row Name 20 1040 20          Treatment Time/Intention    Discipline  physical therapist  -JV  occupational therapist  -SD,GC,SD2     Document Type  therapy note (daily note)  -  therapy note (daily note)  -SD,GC,SD2     Subjective Information  no complaints  -JV  no complaints  -SD,GC,SD2     Mode of Treatment  individual therapy;physical therapy  -JV  occupational therapy  -SD,GC,SD2     Patient/Family Observations  Pt sitting up in recliner, awake/alert and agreeable to tx.  -JV  Pt seated in chair agreeable to therapy  -SD,GC,SD2     Care Plan Review  care plan/treatment goals reviewed  -JV  --     Patient Effort  good  -JV  good  -SD,GC,SD2     Comment  Pt is eager to return home.  -JV  --     Treatment Considerations/Comments  --  Pt is improving in adls and functional mobility, he reported that his catheter is being removed tomorrow and he has no concerns for returning home  -SD,GC,SD2     Recorded by [JV] Bridgette Vela, PT 20 1352 [SD,GC,SD2] Fransico Arroyo OTR (r) Clemente aMrtin OT Student (t) Fransico Arroyo OTR (c) 20 1328     Row Name 20 0924             Cognitive Assessment/Intervention- PT/OT    Personal Safety Interventions  gait belt;fall prevention program maintained;nonskid shoes/slippers when out of bed  -SD,GC,SD2       Recorded by [SD,GC,SD2] Fransico Arroyo OTR (r) Clemente Martin OT Student (t) Fransico Arroyo OTR (c) 01/22/20 1328      Row Name 01/22/20 0924             Safety Issues, Functional Mobility    Impairments Affecting Function (Mobility)  balance  -SD,GC,SD2      Recorded by [SD,GC,SD2] Fransico Arroyo OTR (r) Clemente Martin OT Student (t) Fransico Arroyo OTR (c) 01/22/20 1328      Row Name 01/22/20 0924             Functional Mobility    Functional Mobility- Ind. Level  standby assist  -SD,GC,SD2      Functional Mobility- Device  rolling walker  -SD,GC,SD2      Functional Mobility-Distance (Feet)  20  -SD,GC,SD2      Functional Mobility- Safety Issues  balance decreased during turns  -SD,GC,SD2      Functional Mobility- Comment  Pt appeared to be more stable during functional mobilty  -SD,GC,SD2      Recorded by [SD,GC,SD2] Fransico Arroyo OTR (r) Clemente Martin, OT Student (t) Fransico Arroyo OTR (c) 01/22/20 1328      Row Name 01/22/20 1040 01/22/20 0924          Transfer Assessment/Treatment    Transfer Assessment/Treatment  sit-stand transfer;stand-sit transfer  -JV  sit-stand transfer;stand-sit transfer  -SD,GC,SD2     Comment (Transfers)  --  Pt's transfer was much better today from the chair, this could be due to the chair being higher than the bed  -SD,GC,SD2     Recorded by [JV] Bridgette Vela, PT 01/22/20 1352 [SD,GC,SD2] Fransico Arroyo OTR (r) Clemente Martin OT Student (t) Fransico Arroyo OTR (c) 01/22/20 1328     Row Name 01/22/20 1040 01/22/20 0924          Sit-Stand Transfer    Sit-Stand [place] (Transfers)  stand by assist  -JV  stand by assist  -SD,GC,SD2     Assistive Device (Sit-Stand Transfers)  walker, front-wheeled  -JV  walker, front-wheeled  -SD,GC,SD2     Recorded by [JV] Bridgette Vela, PT 01/22/20 1352 [SD,GC,SD2] Fransico Arroyo OTR (r) Clemente Martin OT Student (t) Fransico Arroyo OTR (c) 01/22/20 1328     Row Name 01/22/20 1040 01/22/20 0939           Stand-Sit Transfer    Stand-Sit [place] (Transfers)  stand by assist  -JV  stand by assist  -SD,GC,SD2     Assistive Device (Stand-Sit Transfers)  walker, front-wheeled  -JV  walker, front-wheeled  -SD,GC,SD2     Recorded by [JV] Bridgette Vela, PT 01/22/20 1352 [SD,GC,SD2] Fransico Arroyo OTR (r) Clemente Martin, OT Student (t) Fransioc Arroyo OTR (c) 01/22/20 1328     Row Name 01/22/20 1040             Gait/Stairs Assessment/Training    [place] Level (Gait)  contact guard;verbal cues  -JV      Assistive Device (Gait)  walker, front-wheeled  -JV      Distance in Feet (Gait)  300' x 2  -JV      Pattern (Gait)  step-through  -JV      Deviations/Abnormal Patterns (Gait)  base of support, narrow;gait speed decreased  -JV      Left Sided Gait Deviations  heel strike decreased;forward flexed posture  -JV      Recorded by [JV] Bridgette Vela, PT 01/22/20 1352      Row Name 01/22/20 0924             Lower Body Dressing Assessment/Training    Lower Body Dressing [place] Level  doff;don;shoes/slippers  -SD,GC,SD2      Lower Body Dressing Position  supported sitting  -SD,GC,SD2      Comment (Lower Body Dressing)  Pt educated on compensatory strategies for LB when he returns home  -SD,GC,SD2      Recorded by [SD,GC,SD2] Fransico Arroyo OTR (r) Clemente Martin, OT Student (t) Fransico Arroyo OTR (c) 01/22/20 1328      Row Name 01/22/20 0924             BADL Safety/Performance    Impairments, BADL Safety/Performance  other (see comments) Pt showed no impairments in BADL performance today  -SD,GC,SD2      Skilled BADL Treatment/Intervention  compensatory training;BADL process/adaptation training  -SD,GC,SD2      Progress in BADL Status  improvement noted  -SD,GC,SD2      Recorded by [SD,GC,SD2] Fransico Arroyo OTR (r) Clemente Martin OT Student (t) Fransico Arroyo OTR (c) 01/22/20 1328      Row Name 01/22/20 1040 01/22/20 0924          Positioning and Restraints    Pre-Treatment Position  sitting  in chair/recliner  -JV  sitting in chair/recliner  -SD,GC,SD2     Post Treatment Position  chair  -JV  chair  -SD,GC,SD2     In Chair  call light within reach;encouraged to call for assist  -JV  call light within reach;encouraged to call for assist;reclined  -SD,GC,SD2     Recorded by [JV] Bridgette Vela, PT 01/22/20 1352 [SD,GC,SD2] Fransico Arroyo OTR (r) Clemente Martin OT Student (t) Fransico Arroyo OTR (c) 01/22/20 1328     Row Name 01/22/20 0924             Pain Scale: Numbers Pre/Post-Treatment    Pain Scale: Numbers, Pretreatment  0/10 - no pain  -SD,GC,SD2      Pain Scale: Numbers, Post-Treatment  0/10 - no pain  -SD,GC,SD2      Recorded by [SD,GC,SD2] Fransico Arroyo OTR (r) Clemente Martin, OT Student (t) Fransico Arroyo OTR (c) 01/22/20 1328      Row Name                Wound 01/16/20 2000 Right posterior elbow Abrasion    Wound - Properties Group Date first assessed: 01/16/20 [RS] Time first assessed: 2000 [RS] Side: Right [RS] Orientation: posterior [RS] Location: elbow [RS] Primary Wound Type: Abrasion [RS] Recorded by:  [RS] Sindy Henson RN 01/17/20 0346    Row Name 01/22/20 1040 01/22/20 0924          Plan of Care Review    Plan of Care Reviewed With  patient  -JV  patient  -SD,GC,SD2     Progress  improving  -JV  --     Outcome Summary  Pt was awake/alert up in chair, agreeable to tx. Pt performs functional transfers with SBA and RW. Pt amb >300' to rehab gym with RW and S. Pt used LE restorator for 10' x 2 reps. Pt amb back to room and was left sitting up in recliner with call light in reach. Pt jackson tx well, and is very motivated to return home when ready. His gait pattern is improving, no LOB observed this date. Will cont plan to progress toward prior level of function with emphasis on functional mob and safety.   -JV  OT: Pt is improving in adls and functional mobility, he reported that his catheter is being removed tomorrow and he has no concerns for returning home. Pt  appeared to be more stable during functional mobilty, and seems to be near his baseline status. Pt educated on compensatory strategies for LB when he returns home.  -SD3     Recorded by [JV] Bridgette Vela, PT 01/22/20 1352 [SD,GC,SD2] Fransico Arroyo, OTR (r) Clemente Martin, OT Student (t) Fransico Arroyo, OTR (c) 01/22/20 1328  [SD3] Frnasico Arroyo, OTR 01/22/20 1330     Row Name 01/22/20 0924             Outcome Summary/Treatment Plan (OT)    Daily Summary of Progress (OT)  progress toward functional goals as expected;prepare for discharge  -SD      Plan for Continued Treatment (OT)  adress any concerns for returning home and DC to home with assist. Anticipate discharge from OT in next 1-2 days  -SD      Recorded by [SD] Fransico Arroyo OTR 01/22/20 1330      Row Name 01/22/20 1040             Outcome Summary/Treatment Plan (PT)    Daily Summary of Progress (PT)  progress toward functional goals is good  -JV      Recorded by [JV] Bridgette Vela, PT 01/22/20 1352        User Key  (r) = Recorded By, (t) = Taken By, (c) = Cosigned By    Initials Name Effective Dates Discipline    SD Fransico Arroyo, OTR 06/22/16 -  OT    RS Sindy Henson, RN 04/28/17 -  Nurse    Bridgette Pappas, PT 09/30/19 -  PT    Clemente Rocha, OT Student 01/08/20 -  OT          Wound 01/16/20 2000 Right posterior elbow Abrasion (Active)   Dressing Appearance dry;intact 1/21/2020  9:04 PM               PT Recommendation and Plan     Outcome Summary/Treatment Plan (PT)  Daily Summary of Progress (PT): progress toward functional goals is good  Plan of Care Reviewed With: patient  Progress: improving  Outcome Summary: Pt was awake/alert up in chair, agreeable to tx. Pt performs functional transfers with SBA and RW. Pt amb >300' to rehab gym with RW and S. Pt used LE restorator for 10' x 2 reps. Pt amb back to room and was left sitting up in recliner with call light in reach. Pt jackson tx well, and is very motivated to return home  when ready. His gait pattern is improving, no LOB observed this date. Will cont plan to progress toward prior level of function with emphasis on functional mob and safety.      Time Calculation:   PT Charges     Row Name 01/22/20 1353             Time Calculation    Start Time  1040  -JV      Stop Time  1140  -JV      Time Calculation (min)  60 min  -JV         Timed Charges    11478 - PT Therapeutic Exercise Minutes  20  -JV      42159 - Gait Training Minutes   30  -JV      57189 - PT Therapeutic Activity Minutes  10  -JV        User Key  (r) = Recorded By, (t) = Taken By, (c) = Cosigned By    Initials Name Provider Type    Bridgette Pappas, PT Physical Therapist        Therapy Charges for Today     Code Description Service Date Service Provider Modifiers Qty    01137830261 HC GAIT TRAINING EA 15 MIN 1/21/2020 Bridgette Vela, PT GP 1    57954100453 HC PT THERAPEUTIC ACT EA 15 MIN 1/21/2020 Bridgette eVla, PT GP 1    47500163466 HC PT THER PROC EA 15 MIN 1/22/2020 Bridgette Vela, PT GP 1    57845580152 HC GAIT TRAINING EA 15 MIN 1/22/2020 Bridgette Vela, PT GP 2    98728215597 HC PT THERAPEUTIC ACT EA 15 MIN 1/22/2020 Bridgette Vela, PT GP 1               Bridgette Vela PT  1/22/2020

## 2020-01-22 NOTE — PLAN OF CARE
Problem: Patient Care Overview  Goal: Plan of Care Review  Flowsheets  Taken 1/21/2020 1045 by Bridgette Vela PT  Progress: improving  Taken 1/22/2020 0924 by Clemente Martin OT Student  Plan of Care Reviewed With: patient  Taken 1/22/2020 0924 by Fransico Arroyo OTR  Outcome Summary: OT: Pt is improving in adls and functional mobility, he reported that his catheter is being removed tomorrow and he has no concerns for returning home. Pt appeared to be more stable during functional mobilty, and seems to be near his baseline status. Pt educated on compensatory strategies for LB when he returns home.

## 2020-01-22 NOTE — THERAPY TREATMENT NOTE
SNF - Occupational Therapy Treatment Note  [facility]     Patient Name: Tony Garrett  : 1926  MRN: [id number]  Today's Date: 2020  Onset of Illness/Injury or Date of Surgery: 20  Date of Referral to OT: 20  Referring Physician: Cassius    Admit Date: 2020     No diagnosis found.  Patient Active Problem List   Diagnosis   • Abnormal electrocardiogram   • Coronary arteriosclerosis in native artery   • Cardiomyopathy (CMS/HCC)   • Carotid bruit   • Dyspnea on exertion   • Sinus bradycardia   • Fatigue   • Encounter for rehabilitation     Past Medical History:   Diagnosis Date   • Atrial fibrillation, chronic    • Chronic kidney disease (CKD), stage III (moderate) (CMS/HCC)    • HTN (hypertension)    • Hyperlipidemia    • Kidney stones      Past Surgical History:   Procedure Laterality Date   • CYSTOSCOPY BLADDER STONE LITHOTRIPSY         Therapy Treatment    Rehabilitation Treatment Summary     Row Name 20 0924             Treatment Time/Intention    Discipline  occupational therapist  -SD,GC,SD2      Document Type  therapy note (daily note)  -SD,GC,SD2      Subjective Information  no complaints  -SD,GC,SD2      Mode of Treatment  occupational therapy  -SD,GC,SD2      Patient/Family Observations  Pt seated in chair agreeable to therapy  -SD,GC,SD2      Patient Effort  good  -SD,GC,SD2      Treatment Considerations/Comments  Pt is improving in adls and functional mobility, he reported that his catheter is being removed tomorrow and he has no concerns for returning home  -SD,GC,SD2      Recorded by [SD,GC,SD2] Fransico Arroyo, [place] (r) Clemente Martin OT Student (t) Fransico Arroyo, OSCARR (c) 20 1328      Row Name 20 0924             Cognitive Assessment/Intervention- PT/OT    Personal Safety Interventions  gait belt;fall prevention program maintained;nonskid shoes/slippers when out of bed  -SD,GC,SD2      Recorded by [SD,GC,SD2] Fransico Arroyo OTR (r) Mario  OSCAR [place] Student (t) Fransico Arroyo OTR (c) 01/22/20 1328      Row Name 01/22/20 0924             Safety Issues, Functional Mobility    Impairments Affecting Function (Mobility)  balance  -SD,GC,SD2      Recorded by [SD,GC,SD2] Fransico Arroyo OTR (r) Clemente Martin OT Student (t) Fransico Arroyo OTR (c) 01/22/20 1328      Row Name 01/22/20 0924             Functional Mobility    Functional Mobility- Ind. Level  standby assist  -SD,GC,SD2      Functional Mobility- Device  rolling walker  -SD,GC,SD2      Functional Mobility-Distance (Feet)  20  -SD,GC,SD2      Functional Mobility- Safety Issues  balance decreased during turns  -SD,GC,SD2      Functional Mobility- Comment  Pt appeared to be more stable during functional mobilty  -SD,GC,SD2      Recorded by [SD,GC,SD2] Fransico Arroyo OTR (r) Clemente Martin OT Student (t) Fransico Arroyo OTR (c) 01/22/20 1328      Row Name 01/22/20 0924             Transfer Assessment/Treatment    Transfer Assessment/Treatment  sit-stand transfer;stand-sit transfer  -SD,GC,SD2      Comment (Transfers)  Pt's transfer was much better today from the chair, this could be due to the chair being higher than the bed  -SD,GC,SD2      Recorded by [SD,GC,SD2] Fransico Arroyo OTR (r) Clemente Martin OT Student (t) Fransico Arroyo OTR (c) 01/22/20 1328      Row Name 01/22/20 0924             Sit-Stand Transfer    Sit-Stand [place] (Transfers)  stand by assist  -SD,GC,SD2      Assistive Device (Sit-Stand Transfers)  walker, front-wheeled  -SD,GC,SD2      Recorded by [SD,GC,SD2] Fransico Arroyo OTR (r) Clemente Martin OT Student (t) Fransico Arroyo OTR (c) 01/22/20 1328      Row Name 01/22/20 0924             Stand-Sit Transfer    Stand-Sit [place] (Transfers)  stand by assist  -SD,GC,SD2      Assistive Device (Stand-Sit Transfers)  walker, front-wheeled  -SD,GC,SD2      Recorded by [SD,GC,SD2] Fransico Arroyo, OTR (r) Clemente Martin OT Student (t)  Fransico Arroyo OTR (c) 01/22/20 1328      Row Name 01/22/20 0924             Lower Body Dressing Assessment/Training    Lower Body Dressing [place] Level  doff;don;shoes/slippers  -SD,GC,SD2      Lower Body Dressing Position  supported sitting  -SD,GC,SD2      Comment (Lower Body Dressing)  Pt educated on compensatory strategies for LB when he returns home  -SD,GC,SD2      Recorded by [SD,GC,SD2] Fransico Arroyo OTR (r) Clemente Martin, OT Student (t) Fransico Arroyo OTR (c) 01/22/20 1328      Row Name 01/22/20 0924             BADL Safety/Performance    Impairments, BADL Safety/Performance  other (see comments) Pt showed no impairments in BADL performance today  -SD,GC,SD2      Skilled BADL Treatment/Intervention  compensatory training;BADL process/adaptation training  -SD,GC,SD2      Progress in BADL Status  improvement noted  -SD,GC,SD2      Recorded by [SD,GC,SD2] Fransico Arroyo OTR (r) Clemente Martin, OT Student (t) Fransico Arroyo OTR (c) 01/22/20 1328      Row Name 01/22/20 0924             Positioning and Restraints    Pre-Treatment Position  sitting in chair/recliner  -SD,GC,SD2      Post Treatment Position  chair  -SD,GC,SD2      In Chair  call light within reach;encouraged to call for assist;reclined  -SD,GC,SD2      Recorded by [SD,GC,SD2] Fransico Arroyo OTR (r) Clemente Martin OT Student (t) Fransico Arroyo OTR (c) 01/22/20 1328      Row Name 01/22/20 0924             Pain Scale: Numbers Pre/Post-Treatment    Pain Scale: Numbers, Pretreatment  0/10 - no pain  -SD,GC,SD2      Pain Scale: Numbers, Post-Treatment  0/10 - no pain  -SD,GC,SD2      Recorded by [SD,GC,SD2] Fransico Arroyo OTLAURIE (r) Clemente Martin, OT Student (t) Fransico Arroyo OTR (c) 01/22/20 1328      Row Name                Wound 01/16/20 2000 Right posterior elbow Abrasion    Wound - Properties Group Date first assessed: 01/16/20 [RS] Time first assessed: 2000 [RS] Side: Right [RS] Orientation: posterior  [RS] Location: elbow [RS] Primary Wound Type: Abrasion [RS] Recorded by:  [RS] Sindy Henson RN 01/17/20 0346    Row Name 01/22/20 0924             Plan of Care Review    Plan of Care Reviewed With  patient  -CHERISE RASMUSSEN,SD2      Outcome Summary  OT: Pt is improving in adls and functional mobility, he reported that his catheter is being removed tomorrow and he has no concerns for returning home. Pt appeared to be more stable during functional mobilty, and seems to be near his baseline status. Pt educated on compensatory strategies for LB when he returns home.  -SD3      Recorded by [SD,CHERISE,SD2] Fransico Arroyo OTR (r) Clemente Martin OT Student (t) Fransico Arrooy OTR (c) 01/22/20 1328  [SD3] Fransico Arroyo OTR 01/22/20 1330      Row Name 01/22/20 0924             Outcome Summary/Treatment Plan (OT)    Daily Summary of Progress (OT)  progress toward functional goals as expected;prepare for discharge  -SD      Plan for Continued Treatment (OT)  adress any concerns for returning home and DC to home with assist. Anticipate discharge from OT in next 1-2 days  -SD      Recorded by [SD] Fransico Arroyo OTR 01/22/20 1330        User Key  (r) = Recorded By, (t) = Taken By, (c) = Cosigned By    Initials Name Effective Dates Discipline    SD Fransico Arroyo OTR 06/22/16 -  OT    RS Sindy Henson RN 04/28/17 -  Nurse    Clemente Rocha OT Student 01/08/20 -  OT        Wound 01/16/20 2000 Right posterior elbow Abrasion (Active)   Dressing Appearance dry;intact 1/21/2020  9:04 PM           OT Recommendation and Plan  Outcome Summary/Treatment Plan (OT)  Daily Summary of Progress (OT): progress toward functional goals is good  Plan for Continued Treatment (OT): continue with POC  Daily Summary of Progress (OT): progress toward functional goals is good  Plan of Care Review  Plan of Care Reviewed With: patient  Plan of Care Reviewed With: patient  Outcome Summary: OT: Pt performed transfers and mobility with  CGA;however pt has a history of BLE neuropathy which increases his falls risk. Pt able to don/doff sock/slippers on R LE independently, yet he required min assist for L LE due to chronic limited ROM and c/o pain in left knee. Pt reports he has support at home if needed for adl's, yet he was receptive to further education with compensatory strategies/use of adaptive equipment to allow for increased adl independence.       Time Calculation:   Time Calculation- OT     Row Name 01/22/20 1329             Time Calculation- OT    OT Start Time  0924  -SD (r) GC (t) SD (c)      OT Stop Time  0944  -SD (r) GC (t) SD (c)      OT Time Calculation (min)  20 min  -SD (r) GC (t)      TCU Minutes- OT  20 min  -SD (r) GC (t) SD (c)         Timed Charges    40339 - OT Self Care/Mgmt Minutes  20  -SD (r) GC (t) SD (c)        User Key  (r) = Recorded By, (t) = Taken By, (c) = Cosigned By    Initials Name Provider Type    SD Fransico Arroyo, OTR Occupational Therapist    GC Clemente Martin, OSCAR Student OT Student        Therapy Charges for Today     Code Description Service Date Service Provider Modifiers Qty    06233590950 HC OT SELF CARE/MGMT/TRAIN EA 15 MIN 1/21/2020 Clemente Martin OT Student GO 2    89113541344 HC OT SELF CARE/MGMT/TRAIN EA 15 MIN 1/22/2020 Clemente Martin OT Student GO 1               OSCAR Villa  1/22/2020

## 2020-01-22 NOTE — PLAN OF CARE
Problem: Patient Care Overview  Goal: Plan of Care Review  Flowsheets (Taken 1/22/2020 1040)  Progress: improving  Plan of Care Reviewed With: patient  Outcome Summary: Pt was awake/alert up in chair, agreeable to tx. Pt performs functional transfers with SBA and RW. Pt amb >300' to rehab gym with RW and S. Pt used LE restorator for 10' x 2 reps. Pt amb back to room and was left sitting up in recliner with call light in reach. Pt jackson tx well, and is very motivated to return home when ready. His gait pattern is improving, no LOB observed this date. Will cont plan to progress toward prior level of function with emphasis on functional mob and safety.

## 2020-01-23 LAB
DEPRECATED RDW RBC AUTO: 46.5 FL (ref 37–54)
ERYTHROCYTE [DISTWIDTH] IN BLOOD BY AUTOMATED COUNT: 13.7 % (ref 12.3–15.4)
HCT VFR BLD AUTO: 25.8 % (ref 37.5–51)
HGB BLD-MCNC: 8.1 G/DL (ref 13–17.7)
MCH RBC QN AUTO: 29.7 PG (ref 26.6–33)
MCHC RBC AUTO-ENTMCNC: 31.4 G/DL (ref 31.5–35.7)
MCV RBC AUTO: 94.5 FL (ref 79–97)
PLATELET # BLD AUTO: 194 10*3/MM3 (ref 140–450)
PMV BLD AUTO: 9.9 FL (ref 6–12)
RBC # BLD AUTO: 2.73 10*6/MM3 (ref 4.14–5.8)
WBC NRBC COR # BLD: 5.49 10*3/MM3 (ref 3.4–10.8)

## 2020-01-23 PROCEDURE — 97535 SELF CARE MNGMENT TRAINING: CPT

## 2020-01-23 PROCEDURE — 85027 COMPLETE CBC AUTOMATED: CPT | Performed by: FAMILY MEDICINE

## 2020-01-23 PROCEDURE — 97116 GAIT TRAINING THERAPY: CPT

## 2020-01-23 PROCEDURE — 99231 SBSQ HOSP IP/OBS SF/LOW 25: CPT | Performed by: INTERNAL MEDICINE

## 2020-01-23 RX ORDER — TAMSULOSIN HYDROCHLORIDE 0.4 MG/1
0.4 CAPSULE ORAL DAILY
Status: DISCONTINUED | OUTPATIENT
Start: 2020-01-23 | End: 2020-01-25 | Stop reason: HOSPADM

## 2020-01-23 RX ADMIN — METOPROLOL SUCCINATE 25 MG: 25 TABLET, EXTENDED RELEASE ORAL at 22:22

## 2020-01-23 RX ADMIN — METOPROLOL SUCCINATE 25 MG: 25 TABLET, EXTENDED RELEASE ORAL at 09:18

## 2020-01-23 RX ADMIN — RAMIPRIL 10 MG: 2.5 CAPSULE ORAL at 09:18

## 2020-01-23 RX ADMIN — WHITE PETROLATUM 41 % TOPICAL OINTMENT: OINTMENT at 22:23

## 2020-01-23 RX ADMIN — PANTOPRAZOLE SODIUM 40 MG: 40 TABLET, DELAYED RELEASE ORAL at 18:10

## 2020-01-23 RX ADMIN — WHITE PETROLATUM 41 % TOPICAL OINTMENT: OINTMENT at 09:19

## 2020-01-23 RX ADMIN — TAMSULOSIN HYDROCHLORIDE 0.4 MG: 0.4 CAPSULE ORAL at 09:18

## 2020-01-23 RX ADMIN — ZOLPIDEM TARTRATE 5 MG: 5 TABLET ORAL at 22:23

## 2020-01-23 RX ADMIN — AMLODIPINE BESYLATE 5 MG: 5 TABLET ORAL at 09:18

## 2020-01-23 RX ADMIN — SIMVASTATIN 40 MG: 40 TABLET, FILM COATED ORAL at 22:22

## 2020-01-23 NOTE — THERAPY TREATMENT NOTE
SNF - Physical Therapy Treatment Note   [facility]     Patient Name: Tony Garrett  : 1926  MRN: [id number]  Today's Date: 2020  Onset of Illness/Injury or Date of Surgery: 20  Date of Referral to PT: 20  Referring Physician: Cassius    Admit Date: 2020    Visit Dx:  No diagnosis found.  Patient Active Problem List   Diagnosis   • Abnormal electrocardiogram   • Coronary arteriosclerosis in native artery   • Cardiomyopathy (CMS/HCC)   • Carotid bruit   • Dyspnea on exertion   • Sinus bradycardia   • Fatigue   • Encounter for rehabilitation       Therapy Treatment    Rehabilitation Treatment Summary     Row Name 20 1050 20 0910          Treatment Time/Intention    Discipline  physical therapist  -BP  occupational therapist  (Pended)   -GC     Document Type  therapy note (daily note)  -BP  therapy note (daily note)  (Pended)   -     Subjective Information  no complaints  -BP  no complaints  (Pended)   -GC     Mode of Treatment  physical therapy  -BP  occupational therapy  (Pended)   -GC     Patient/Family Observations  Patient seated in bedside chair. Agreeable to PT treatment   -BP  Pt seated in chair and agreed to therapy  (Pended)   -GC     Care Plan Review  care plan/treatment goals reviewed;risks/benefits reviewed  -BP  care plan/treatment goals reviewed;risks/benefits reviewed;current/potential barriers reviewed;patient/other agree to care plan  (Pended)   -GC     Patient Effort  good  -BP  good  (Pended)   -GC     Existing Precautions/Restrictions  fall  -BP  --     Recorded by [BP] Jonnie Clark, PT 20 1329 [GC] Clemente Martin, OT Student 20 1213     Row Name 20 1050 20 0910          Cognitive Assessment/Intervention- PT/OT    Personal Safety Interventions  gait belt;nonskid shoes/slippers when out of bed  -BP  nonskid shoes/slippers when out of bed;gait belt;fall prevention program maintained  (Pended)   -GC     Recorded by [BP]  Jonnie Clark, PT 01/23/20 1329 [GC] Clemente Martin, OT Student 01/23/20 1213     Row Name 01/23/20 0910             Safety Issues, Functional Mobility    Impairments Affecting Function (Mobility)  balance;pain;range of motion (ROM)  (Pended)   -GC      Recorded by [GC] Clemente Martin, OT Student 01/23/20 1213      Row Name 01/23/20 1050             Bed Mobility Assessment/Treatment    Comment (Bed Mobility)  deferred, patient reports independence with bed mobility   -BP      Recorded by [BP] Jonnie Clark, PT 01/23/20 1329      Row Name 01/23/20 0910             Functional Mobility    Functional Mobility- Ind. Level  supervision required  (Pended)   -GC      Functional Mobility- Device  rolling walker  (Pended)   -GC      Functional Mobility-Distance (Feet)  25  (Pended)   -GC      Recorded by [GC] Clemente Martin, OT Student 01/23/20 1213      Row Name 01/23/20 1050 01/23/20 0910          Transfer Assessment/Treatment    Transfer Assessment/Treatment  sit-stand transfer;stand-sit transfer  -BP  sit-stand transfer;stand-sit transfer  (Pended)   -     Comment (Transfers)  Patient demonstrates proper hand placement  -BP  --     Recorded by [BP] Jonnie Clark, PT 01/23/20 1329 [GC] Clemente Martin, OT Student 01/23/20 1213     Row Name 01/23/20 1050 01/23/20 0910          Sit-Stand Transfer    Sit-Stand [place] (Transfers)  conditional independence  -BP  contact guard  (Pended)   -GC     Assistive Device (Sit-Stand Transfers)  walker, front-wheeled  -BP  walker, front-wheeled  (Pended)   -GC2     Recorded by [BP] Jonnie Clark, PT 01/23/20 1329 [GC] Clemente Martin, OT Student 01/23/20 1216  [GC2] Clemente Martin, OT Student 01/23/20 1213     Row Name 01/23/20 1050 01/23/20 0910          Stand-Sit Transfer    Stand-Sit [place] (Transfers)  conditional independence  -BP  stand by assist  (Pended)   -GC     Assistive Device (Stand-Sit Transfers)  walker, front-wheeled  -BP  walker, front-wheeled   (Pended)   -GC     Recorded by [BP] Jonnie Clark, PT 01/23/20 1329 [GC] Clemente Martin, OT Student 01/23/20 1213     Row Name 01/23/20 0910             Toilet Transfer    Type (Toilet Transfer)  stand-sit;sit-stand  (Pended)   -GC      [place] Level (Toilet Transfer)  supervision  (Pended)   -GC      Assistive Device (Toilet Transfer)  commode, 3-in-1  (Pended)   -GC      Recorded by [GC] Clemente Martin, OT Student 01/23/20 1216      Row Name 01/23/20 1050             Gait/Stairs Assessment/Training    [place] Level (Gait)  conditional independence  -BP      Assistive Device (Gait)  walker, front-wheeled  -BP      Distance in Feet (Gait)  800  -BP      Pattern (Gait)  swing-through  -BP      Deviations/Abnormal Patterns (Gait)  gait speed decreased;stride length decreased  -BP      Bilateral Gait Deviations  forward flexed posture;foot drop/toe drag  -BP      Comment (Gait/Stairs)  Patient manages directional changes and all external obstacles without LOB or need for cues. Patient manages AD safely. Patient ascends/descends 10 stairs with 2 handrails with SBA.   -BP      Recorded by [BP] Jonnie Clark, PT 01/23/20 1329      Row Name 01/23/20 0910             Lower Body Dressing Assessment/Training    Lower Body Dressing [place] Level  don;doff;shoes/slippers;socks;minimum assist (75% patient effort);set up;independent;pants/bottoms  (Pended)   -GC      Lower Body Dressing Position  unsupported sitting  (Pended)   -GC      Recorded by [GC] Clemente Martin, OT Student 01/23/20 1213      Row Name 01/23/20 0910             Toileting Assessment/Training    [place] Level (Toileting)  supervision;adjust/manage clothing  (Pended)   -GC      Assistive Devices (Toileting)  commode, 3-in-1  (Pended)  Walker front wheeled  -GC      Toileting Position  supported sitting  (Pended)   -GC      Comment (Toileting)  Pt able to don/doff pants on R LE independently. Pt requires min A to manage catheter when  donning pants during LB dressing.   (Pended)   -GC2      Recorded by [GC] Clemente Martin, OT Student 01/23/20 1213  [GC2] Clemente Martin, OT Student 01/23/20 1226      Row Name 01/23/20 0910             BADL Safety/Performance    Impairments, BADL Safety/Performance  balance  (Pended)   -GC      Skilled BADL Treatment/Intervention  compensatory training;BADL process/adaptation training;environmental modifications  (Pended)   -GC      Progress in BADL Status  improvement noted  (Pended)   -GC      Recorded by [GC] Clemente Martin, OT Student 01/23/20 1226      Row Name 01/23/20 1050             Motor Skills Assessment/Interventions    Additional Documentation  Therapeutic Exercise (Group)  -BP      Recorded by [BP] Jonnie Clark, PT 01/23/20 1329      Row Name 01/23/20 1050             Therapeutic Exercise    Therapeutic Exercise  standing, lower extremities  -BP      Recorded by [BP] Jonnie Clark, PT 01/23/20 1329      Row Name 01/23/20 1050             Lower Extremity Standing Therapeutic Exercise    Performed, Standing Lower Extremity (Therapeutic Exercise)  mini-squats;heel raises;hip abduction/adduction;hip/knee flexion/extension  -BP      Sets/Reps Detail, Standing Lower Extremity (Therapeutic Exercise)  1/10-B LE's   -BP      Recorded by [BP] Jonnie Clark, PT 01/23/20 1329      Row Name 01/23/20 1050 01/23/20 0910          Positioning and Restraints    Pre-Treatment Position  sitting in chair/recliner  -BP  sitting in chair/recliner  (Pended)   -GC     Post Treatment Position  chair  -BP  chair  (Pended)   -GC     In Chair  call light within reach;encouraged to call for assist;sitting  -BP  sitting;call light within reach;encouraged to call for assist  (Pended)   -GC     Recorded by [BP] Jonnie Clark, PT 01/23/20 1329 [GC] Clemente Martin, OT Student 01/23/20 1226     Row Name 01/23/20 1050 01/23/20 0910          Pain Scale: Numbers Pre/Post-Treatment    Pain Scale: Numbers, Pretreatment  0/10  - no pain  -BP  0/10 - no pain  (Pended)   -     Pain Scale: Numbers, Post-Treatment  0/10 - no pain  -BP  0/10 - no pain  (Pended)   -GC     Recorded by [BP] Jonnie Clark, PT 01/23/20 1329 [GC] Clemente Martin, OT Student 01/23/20 1213     Row Name                Wound 01/16/20 2000 Right posterior elbow Abrasion    Wound - Properties Group Date first assessed: 01/16/20 [RS] Time first assessed: 2000 [RS] Side: Right [RS] Orientation: posterior [RS] Location: elbow [RS] Primary Wound Type: Abrasion [RS] Recorded by:  [RS] Sindy Henson RN 01/17/20 0346    Row Name 01/23/20 1050 01/23/20 0910          Plan of Care Review    Plan of Care Reviewed With  patient  -BP  patient  (Pended)   -     Progress  improving  -BP  improving  (Pended)   -     Outcome Summary  PT: Patient performs sit to/from stand transfers with conditional independence and gait x 800 feet with conditional independence with use of FWW. Patient manages device safely. No loss of  balance noted. He manages directional changes and external obstacles safely. Patient ascends/descends 10 stais with 2 handrails with SBA. Initiated standing LE ther ex. Plan to see one additional visit. Will discharge patient from PT on 1/24. Recommend continued mobility with use of FWW and home health PT.   -BP  OT: Pt is independent in all adls exculding LB dressing. Pt requires min assist to manage catheter while donning pants. Pt reported that he has support at home if needed. Plan is to follow up with pt after catheter is removed to determine independence of LB dressing without managing the catheter.  (Pended)   -GC2     Recorded by [BP] Jonnie Clark, PT 01/23/20 1329 [GC] Clemente Martin, OT Student 01/23/20 1226  [GC2] Clemente Martin, OT Student 01/23/20 1229     Row Name 01/23/20 0910             Outcome Summary/Treatment Plan (OT)    Daily Summary of Progress (OT)  progress toward functional goals as expected  (Pended)   -      Plan for  Continued Treatment (OT)  continue with POC  (Pended)   -GC      Recorded by [GC] Clemente Martin, OT Student 01/23/20 1226      Row Name 01/23/20 1050             Outcome Summary/Treatment Plan (PT)    Daily Summary of Progress (PT)  progress toward functional goals as expected;prepare for discharge  -BP      Recorded by [BP] Jonnie Clark, PT 01/23/20 1329        User Key  (r) = Recorded By, (t) = Taken By, (c) = Cosigned By    Initials Name Effective Dates Discipline    BP Eduardo Jonnie, PT 04/03/18 -  PT    RS Sindy Henson RN 04/28/17 -  Nurse    Clemente Rocha, OT Student 01/08/20 -  OT          Wound 01/16/20 2000 Right posterior elbow Abrasion (Active)   Dressing Appearance dry;intact 1/22/2020  8:16 PM               PT Recommendation and Plan  Anticipated Discharge Disposition (PT): home with home health  Planned Therapy Interventions (PT Eval): bed mobility training, gait training, home exercise program, patient/family education, transfer training, stair training, strengthening  Therapy Frequency (PT Clinical Impression): 5 times/wk  Outcome Summary/Treatment Plan (PT)  Daily Summary of Progress (PT): progress toward functional goals as expected, prepare for discharge  Anticipated Discharge Disposition (PT): home with home health  Plan of Care Reviewed With: patient  Progress: improving  Outcome Summary: PT: Patient performs sit to/from stand transfers with conditional independence and gait x 800 feet with conditional independence with use of FWW. Patient manages device safely. No loss of  balance noted. He manages directional changes and external obstacles safely. Patient ascends/descends 10 stais with 2 handrails with SBA. Initiated standing LE ther ex. Plan to see one additional visit. Will discharge patient from PT on 1/24. Recommend continued mobility with use of FWW and home health PT.      Time Calculation:   PT Charges     Row Name 01/23/20 1330             Time Calculation    Start  Time  1050  -BP      Stop Time  1111  -BP      Time Calculation (min)  21 min  -BP      PT Received On  01/23/20  -BP      PT - Next Appointment  01/24/20  -BP         Timed Charges    97834 - PT Therapeutic Exercise Minutes  6  -BP      24050 - Gait Training Minutes   15  -BP        User Key  (r) = Recorded By, (t) = Taken By, (c) = Cosigned By    Initials Name Provider Type    BP Jonnie Clark, PT Physical Therapist        Therapy Charges for Today     Code Description Service Date Service Provider Modifiers Qty    47650124903 HC GAIT TRAINING EA 15 MIN 1/23/2020 Jonnie Clark, PT GP 1               Jonnie Clark, PT  1/23/2020

## 2020-01-23 NOTE — NURSING NOTE
"Patient complaining of bilateral legs \"not working correctly because not on Eliquis.\"  Patient is able to walk with his rolling walker and stand by assist without issues noted. Spoke with Dr Morrissey who is covering for Dr Corrales and stated he does not want to make the call to put him back on his Eliquis at this time he will let Dr Virk make the decision.    "

## 2020-01-23 NOTE — PROGRESS NOTES
BGA/GI Progress Note   Chief Complaint: abdominal pain  Subjective     Interval History:     No complaints. Stools are brown  Review of Systems:    The following systems were reviewed and negative;  gastrointestinal    Objective     Vital Signs  Temp:  [97.8 °F (36.6 °C)] 97.8 °F (36.6 °C)  Heart Rate:  [76-79] 76  Resp:  [18] 18  BP: (106-117)/(56-59) 117/56  Body mass index is 24.99 kg/m².    Intake/Output Summary (Last 24 hours) at 1/23/2020 1407  Last data filed at 1/23/2020 0918  Gross per 24 hour   Intake 480 ml   Output 850 ml   Net -370 ml     I/O this shift:  In: 240 [P.O.:240]  Out: 850 [Urine:850]       Physical Exam:   General: patient awake, alert and cooperative   Eyes: Normal lids and lashes, no scleral icterus   Neck: supple, normal ROM   Skin: warm and dry, not jaundiced   Cardiovascular: regular rhythm and rate, no murmurs auscultated   Pulm: clear to auscultation bilaterally, regular and unlabored   Abdomen: soft, nontender, nondistended; normal bowel sounds   Rectal: deferred   Extremities: no rash or edema   Neurologic: Normal mood and behavior     Results Review:     I reviewed the patient's new clinical results.      WBC No results found for: WBCS   HGB Hemoglobin   Date Value Ref Range Status   01/23/2020 8.1 (L) 13.0 - 17.7 g/dL Final   01/22/2020 8.4 (L) 13.0 - 17.7 g/dL Final      HCT Hematocrit   Date Value Ref Range Status   01/23/2020 25.8 (L) 37.5 - 51.0 % Final   01/22/2020 26.3 (L) 37.5 - 51.0 % Final      Platlets No results found for: LABPLAT     PT/INR:  No results found for: PROTIME/No results found for: INR    Sodium No results found for: NA   Potassium No results found for: K   Chloride No results found for: CL   Bicarbonate No results found for: PLASMABICARB   BUN No results found for: BUN   Creatinine No results found for: CREATININE   Calcium No results found for: CALCIUM   Magnesium  AST  ALT  Bilirubin, Total  AlkPhos  Albumin    Amylase  Lipase    Radiology: No  results found for: MG  No components found for: AST.*  No components found for: ALT.*  No components found for: BILIRUBIN, TOTAL.*    No components found for: ALKPHOS.*  No components found for: ALBUMIN.*      No components found for: AMYLASE.*  No components found for: LIPASE.*    Imaging Results (Most Recent)     None                Assessment/Plan     Anemia/gi bleed-resolved. Declined endoscopic evaluation previously. No evidence of active bleeding. Keep on bid ppi for now.  Hemoglobin from yesterday looks stable.  Will see only as needed      Aimee Garcia MD  01/23/20  2:07 PM

## 2020-01-23 NOTE — PROGRESS NOTES
Incomplete bladder emptying bladder neck contracture recent stone manipulation removal and removal of stent noted with physical therapy ambulation is improving we will add Flomax voiding trial 3 to 5 days will need follow-up renal ultrasound with office visit in 1 month

## 2020-01-23 NOTE — PLAN OF CARE
Problem: Patient Care Overview  Goal: Plan of Care Review  Outcome: Ongoing (interventions implemented as appropriate)  Flowsheets (Taken 1/22/2020 1040 by Bridgette Vela, PT)  Progress: improving

## 2020-01-23 NOTE — PLAN OF CARE
Problem: Patient Care Overview  Goal: Plan of Care Review  Flowsheets (Taken 1/23/2020 1050)  Plan of Care Reviewed With: patient  Outcome Summary: PT: Patient performs sit to/from stand transfers with conditional independence and gait x 800 feet with conditional independence with use of FWW. Patient manages device safely. No loss of  balance noted. He manages directional changes and external obstacles safely. Patient ascends/descends 10 stais with 2 handrails with SBA. Initiated standing LE ther ex. Plan to see one additional visit. Will discharge patient from PT on 1/24. Recommend continued mobility with use of FWW and home health PT.

## 2020-01-23 NOTE — THERAPY TREATMENT NOTE
SNF - Occupational Therapy Treatment Note   [facility]     Patient Name: Tony Garrett  : 1926  MRN: [id number]  Today's Date: 2020  Onset of Illness/Injury or Date of Surgery: 20  Date of Referral to OT: 20  Referring Physician: Cassius    Admit Date: 2020     No diagnosis found.  Patient Active Problem List   Diagnosis   • Abnormal electrocardiogram   • Coronary arteriosclerosis in native artery   • Cardiomyopathy (CMS/HCC)   • Carotid bruit   • Dyspnea on exertion   • Sinus bradycardia   • Fatigue   • Encounter for rehabilitation     Past Medical History:   Diagnosis Date   • Atrial fibrillation, chronic    • Chronic kidney disease (CKD), stage III (moderate) (CMS/HCC)    • HTN (hypertension)    • Hyperlipidemia    • Kidney stones      Past Surgical History:   Procedure Laterality Date   • CYSTOSCOPY BLADDER STONE LITHOTRIPSY         Therapy Treatment    Rehabilitation Treatment Summary     Row Name 20 1050 20 0910          Treatment Time/Intention    Discipline  physical therapist  -BP  occupational therapist  -SD,GC,SD2     Document Type  therapy note (daily note)  -BP  therapy note (daily note)  -SD,GC,SD2     Subjective Information  no complaints  -BP  no complaints  -SD,GC,SD2     Mode of Treatment  physical therapy  -BP  occupational therapy  -SD,GC,SD2     Patient/Family Observations  Patient seated in bedside chair. Agreeable to PT treatment   -BP  Pt seated in chair and agreed to therapy  -SD,GC,SD2     Care Plan Review  care plan/treatment goals reviewed;risks/benefits reviewed  -BP  care plan/treatment goals reviewed;risks/benefits reviewed;current/potential barriers reviewed;patient/other agree to care plan  -SD,GC,SD2     Patient Effort  good  -BP  good  -SD,GC,SD2     Existing Precautions/Restrictions  fall  -BP  --     Recorded by [BP] Jonnie Clark, PT 20 1329 [SD,GC,SD2] Fransico Arroyo OTR (r) Clemente Martin OT Student (t) Cruz  [facility] [place] (c) 01/23/20 1349     Row Name 01/23/20 1050 01/23/20 0910          Cognitive Assessment/Intervention- PT/OT    Personal Safety Interventions  gait belt;nonskid shoes/slippers when out of bed  -BP  nonskid shoes/slippers when out of bed;gait belt;fall prevention program maintained  -SD,GC,SD2     Recorded by [BP] Jonnie Clark, PT 01/23/20 1329 [SD,GC,SD2] Fransico Arroyo OTLAURIE (r) Clemente Martin, OT Student (t) Fransico Arroyo OTR (c) 01/23/20 1349     Row Name 01/23/20 0910             Safety Issues, Functional Mobility    Impairments Affecting Function (Mobility)  balance;pain;range of motion (ROM)  -SD,GC,SD2      Recorded by [SD,GC,SD2] Fransico Arroyo OTR (r) Clemente Martin, OT Student (t) Fransico Arroyo OTR (c) 01/23/20 1349      Row Name 01/23/20 1050             Bed Mobility Assessment/Treatment    Comment (Bed Mobility)  deferred, patient reports independence with bed mobility   -BP      Recorded by [BP] Jonnie Clark, PT 01/23/20 1329      Row Name 01/23/20 0910             Functional Mobility    Functional Mobility- Ind. Level  supervision required  -SD,GC,SD2      Functional Mobility- Device  rolling walker  -SD,GC,SD2      Functional Mobility-Distance (Feet)  25  -SD,GC,SD2      Recorded by [SD,GC,SD2] Fransico Arroyo OTR (r) Clemente Martin, OT Student (t) Fransico Arroyo OTR (c) 01/23/20 1349      Row Name 01/23/20 1050 01/23/20 0910          Transfer Assessment/Treatment    Transfer Assessment/Treatment  sit-stand transfer;stand-sit transfer  -BP  sit-stand transfer;stand-sit transfer  -SD,GC,SD2     Comment (Transfers)  Patient demonstrates proper hand placement  -BP  --     Recorded by [BP] Jonnie Clark, PT 01/23/20 1329 [SD,GC,SD2] Fransico Arroyo OTR (r) Clemente Martin OT Student (t) Fransico Arroyo, OSCARR (c) 01/23/20 1349     Row Name 01/23/20 1050 01/23/20 0910          Sit-Stand Transfer    Sit-Stand [place] (Transfers)  conditional  independence  -BP  stand by assist  -SD     Assistive Device (Sit-Stand Transfers)  walker, front-wheeled  -BP  walker, front-wheeled  -SD2,GC,SD3     Recorded by [BP] Jonnie Clark, PT 01/23/20 1329 [SD] Fransico Arroyo OTR 01/23/20 1348  [SD2,GC,SD3] Fransico Arroyo OTR (r) Clemente Martin, OT Student (t) Fransico Arroyo OTR (c) 01/23/20 1349     Row Name 01/23/20 1050 01/23/20 0910          Stand-Sit Transfer    Stand-Sit [place] (Transfers)  conditional independence  -BP  stand by assist  -SD,GC,SD2     Assistive Device (Stand-Sit Transfers)  walker, front-wheeled  -BP  walker, front-wheeled  -SD,GC,SD2     Recorded by [BP] Jonnie Clark, PT 01/23/20 1329 [SD,GC,SD2] Fransico Arroyo OTR (r) Clemente Martin, OT Student (t) Fransico Arroyo OTR (c) 01/23/20 1349     Row Name 01/23/20 0910             Toilet Transfer    Type (Toilet Transfer)  stand-sit;sit-stand  -SD,GC,SD2      [place] Level (Toilet Transfer)  supervision  -SD,GC,SD2      Assistive Device (Toilet Transfer)  commode, 3-in-1  -SD,GC,SD2      Recorded by [SD,GC,SD2] Fransico Arroyo OTR (r) Clemente Martin, OT Student (t) Fransico Arroyo OTR (c) 01/23/20 1349      Row Name 01/23/20 1050             Gait/Stairs Assessment/Training    [place] Level (Gait)  conditional independence  -BP      Assistive Device (Gait)  walker, front-wheeled  -BP      Distance in Feet (Gait)  800  -BP      Pattern (Gait)  swing-through  -BP      Deviations/Abnormal Patterns (Gait)  gait speed decreased;stride length decreased  -BP      Bilateral Gait Deviations  forward flexed posture;foot drop/toe drag  -BP      Comment (Gait/Stairs)  Patient manages directional changes and all external obstacles without LOB or need for cues. Patient manages AD safely. Patient ascends/descends 10 stairs with 2 handrails with SBA.   -BP      Recorded by [BP] Jonnie Clark, PT 01/23/20 1329      Row Name 01/23/20 0910             Lower Body  Dressing Assessment/Training    Lower Body Dressing [place] Level  don;doff;shoes/slippers;socks;minimum assist (75% patient effort);set up;independent;pants/bottoms  -SD,GC,SD2      Lower Body Dressing Position  unsupported sitting  -SD,GC,SD2      Recorded by [SD,GC,SD2] Fransico Arroyo OTR (r) Clemente Martin, OT Student (t) Fransico Arroyo OTR (c) 01/23/20 1349      Row Name 01/23/20 0910             Toileting Assessment/Training    [place] Level (Toileting)  supervision;adjust/manage clothing  -SD,GC,SD2      Assistive Devices (Toileting)  commode, 3-in-1 Walker front wheeled  -SD,GC,SD2      Toileting Position  supported sitting  -SD,GC,SD2      Comment (Toileting)  Pt able to don/doff pants on R LE independently. Pt requires min A to manage catheter when donning pants during LB dressing.   -SD,GC,SD2      Recorded by [SD,GC,SD2] Fransico Arroyo OTR (r) Clemente Martin, OT Student (t) Fransico Arroyo OTR (c) 01/23/20 1349      Row Name 01/23/20 0910             BADL Safety/Performance    Impairments, BADL Safety/Performance  balance  -SD,GC,SD2      Skilled BADL Treatment/Intervention  compensatory training;BADL process/adaptation training;environmental modifications  -SD,GC,SD2      Progress in BADL Status  improvement noted  -SD,GC,SD2      Recorded by [SD,GC,SD2] Fransico Arroyo OTR (r) Clemente Martin, OT Student (t) Fransico Arroyo OTR (c) 01/23/20 1349      Row Name 01/23/20 1050             Motor Skills Assessment/Interventions    Additional Documentation  Therapeutic Exercise (Group)  -BP      Recorded by [BP] Jonnie Clark, PT 01/23/20 1329      Row Name 01/23/20 1050             Therapeutic Exercise    Therapeutic Exercise  standing, lower extremities  -BP      Recorded by [BP] Jonnie Clark, PT 01/23/20 1329      Row Name 01/23/20 1050             Lower Extremity Standing Therapeutic Exercise    Performed, Standing Lower Extremity (Therapeutic Exercise)   mini-squats;heel raises;hip abduction/adduction;hip/knee flexion/extension  -BP      Sets/Reps Detail, Standing Lower Extremity (Therapeutic Exercise)  1/10-B LE's   -BP      Recorded by [BP] Jonnie Clark, PT 01/23/20 1329      Row Name 01/23/20 1050 01/23/20 0910          Positioning and Restraints    Pre-Treatment Position  sitting in chair/recliner  -BP  sitting in chair/recliner  -SD,GC,SD2     Post Treatment Position  chair  -BP  chair  -SD,GC,SD2     In Chair  call light within reach;encouraged to call for assist;sitting  -BP  sitting;call light within reach;encouraged to call for assist  -SD,GC,SD2     Recorded by [BP] Jonnie Clark, PT 01/23/20 1329 [SD,GC,SD2] Fransico Arroyo OTR (r) Clemente Martin, OT Student (t) Fransico Arroyo OTR (c) 01/23/20 1349     Row Name 01/23/20 1050 01/23/20 0910          Pain Scale: Numbers Pre/Post-Treatment    Pain Scale: Numbers, Pretreatment  0/10 - no pain  -BP  0/10 - no pain  -SD,GC,SD2     Pain Scale: Numbers, Post-Treatment  0/10 - no pain  -BP  0/10 - no pain  -SD,GC,SD2     Recorded by [BP] Jonnie Clark, PT 01/23/20 1329 [SD,GC,SD2] Fransico Arroyo OTR (r) Clemente Martin, OT Student (t) Fransico Arroyo OTR (c) 01/23/20 1349     Row Name                Wound 01/16/20 2000 Right posterior elbow Abrasion    Wound - Properties Group Date first assessed: 01/16/20 [RS] Time first assessed: 2000 [RS] Side: Right [RS] Orientation: posterior [RS] Location: elbow [RS] Primary Wound Type: Abrasion [RS] Recorded by:  [RS] Sindy Henson RN 01/17/20 0346    Row Name 01/23/20 1050 01/23/20 0910          Plan of Care Review    Plan of Care Reviewed With  patient  -BP  patient  -SD,GC,SD2     Progress  improving  -BP  improving  -SD,GC,SD2     Outcome Summary  PT: Patient performs sit to/from stand transfers with conditional independence and gait x 800 feet with conditional independence with use of FWW. Patient manages device safely. No loss of   balance noted. He manages directional changes and external obstacles safely. Patient ascends/descends 10 stais with 2 handrails with SBA. Initiated standing LE ther ex. Plan to see one additional visit. Will discharge patient from PT on 1/24. Recommend continued mobility with use of FWW and home health PT.   -BP  OT: Pt is independent in all adls exculding LB dressing. Pt requires min assist to manage catheter while donning pants. Pt reported that he has support at home if needed. Plan is to follow up on 1-24-20 to ensure pt has no concerns for discharge to home  -SD3     Recorded by [BP] Jonnie Clark, PT 01/23/20 1329 [SD,GC,SD2] Fransico Arroyo OTLAURIE (r) Clemente Martin OT Student (t) Fransico Arroyo OTR (c) 01/23/20 1349  [SD3] Fransico Arroyo OTR 01/23/20 1348     Row Name 01/23/20 0910             Outcome Summary/Treatment Plan (OT)    Daily Summary of Progress (OT)  progress toward functional goals as expected  -SD,GC,SD2      Plan for Continued Treatment (OT)  continue with POC  -SD,GC,SD2      Recorded by [SD,GC,SD2] Fransico Arroyo OTR (r) Clemente Martin OT Student (t) Fransico Arroyo OTR (c) 01/23/20 1349      Row Name 01/23/20 1050             Outcome Summary/Treatment Plan (PT)    Daily Summary of Progress (PT)  progress toward functional goals as expected;prepare for discharge  -BP      Recorded by [BP] Jonnie Clark, PT 01/23/20 1329        User Key  (r) = Recorded By, (t) = Taken By, (c) = Cosigned By    Initials Name Effective Dates Discipline    SD Fransico Arroyo OTR 06/22/16 -  OT    BP Jonnie Clark, PT 04/03/18 -  PT    Sindy Kimbrough, RN 04/28/17 -  Nurse    Clemente Rocha OT Student 01/08/20 -  OT        Wound 01/16/20 2000 Right posterior elbow Abrasion (Active)   Dressing Appearance dry;intact 1/22/2020  8:16 PM           OT Recommendation and Plan  Outcome Summary/Treatment Plan (OT)  Daily Summary of Progress (OT): progress toward functional goals  as expected  Plan for Continued Treatment (OT): continue with POC  Daily Summary of Progress (OT): progress toward functional goals as expected  Plan of Care Review  Plan of Care Reviewed With: patient  Plan of Care Reviewed With: patient  Outcome Summary: OT: Pt performed transfers and mobility with CGA;however pt has a history of BLE neuropathy which increases his falls risk. Pt able to don/doff sock/slippers on R LE independently, yet he required min assist for L LE due to chronic limited ROM and c/o pain in left knee. Pt reports he has support at home if needed for adl's, yet he was receptive to further education with compensatory strategies/use of adaptive equipment to allow for increased adl independence.       Time Calculation:   Time Calculation- OT     Row Name 01/23/20 1330 01/23/20 1230          Time Calculation- OT    OT Start Time  --  0908  -SD (r) GC (t) SD (c)     OT Stop Time  --  1003  -SD (r) GC (t) SD (c)     OT Time Calculation (min)  --  55 min  -SD (r) GC (t)     TCU Minutes- OT  --  55 min  -SD (r) GC (t) SD (c)        Timed Charges    86734 - Gait Training Minutes   15  -BP  --     05599 - OT Self Care/Mgmt Minutes  --  55  -SD (r) GC (t) SD (c)       User Key  (r) = Recorded By, (t) = Taken By, (c) = Cosigned By    Initials Name Provider Type    SD Fransico Arroyo, OTR Occupational Therapist    BP Jonnie Clark, PT Physical Therapist    GC Clemente Martin, OT Student OT Student        Therapy Charges for Today     Code Description Service Date Service Provider Modifiers Qty    13646656019 HC OT SELF CARE/MGMT/TRAIN EA 15 MIN 1/22/2020 Clemente Martin OT Student GO 1    49780296629 HC OT SELF CARE/MGMT/TRAIN EA 15 MIN 1/23/2020 Clemente Martin OT Student GO 4               Clemente Martin OT Student  1/23/2020

## 2020-01-23 NOTE — NURSING NOTE
Emery with Dr Bustamante's office did okay to remove patient's lobo today and start the voiding trial.

## 2020-01-23 NOTE — PLAN OF CARE
Problem: Patient Care Overview  Goal: Plan of Care Review  Flowsheets (Taken 1/23/2020 5464)  Outcome Summary: OT: Pt is independent in all adls exculding LB dressing. Pt requires min assist to manage catheter while donning pants. Pt reported that he has support at home if needed. Plan is to follow up on 1-24-20 to ensure pt has no concerns for discharge to home

## 2020-01-23 NOTE — NURSING NOTE
16 F lobo removed at this time without difficulty.  Voiding trial to start and urinal provided.  Will monitor

## 2020-01-24 LAB
BASOPHILS # BLD AUTO: 0.02 10*3/MM3 (ref 0–0.2)
BASOPHILS NFR BLD AUTO: 0.4 % (ref 0–1.5)
DEPRECATED RDW RBC AUTO: 45.8 FL (ref 37–54)
EOSINOPHIL # BLD AUTO: 0.14 10*3/MM3 (ref 0–0.4)
EOSINOPHIL NFR BLD AUTO: 3.1 % (ref 0.3–6.2)
ERYTHROCYTE [DISTWIDTH] IN BLOOD BY AUTOMATED COUNT: 13.4 % (ref 12.3–15.4)
HCT VFR BLD AUTO: 27.3 % (ref 37.5–51)
HGB BLD-MCNC: 8.6 G/DL (ref 13–17.7)
IMM GRANULOCYTES # BLD AUTO: 0.01 10*3/MM3 (ref 0–0.05)
IMM GRANULOCYTES NFR BLD AUTO: 0.2 % (ref 0–0.5)
LYMPHOCYTES # BLD AUTO: 0.65 10*3/MM3 (ref 0.7–3.1)
LYMPHOCYTES NFR BLD AUTO: 14.6 % (ref 19.6–45.3)
MCH RBC QN AUTO: 29.9 PG (ref 26.6–33)
MCHC RBC AUTO-ENTMCNC: 31.5 G/DL (ref 31.5–35.7)
MCV RBC AUTO: 94.8 FL (ref 79–97)
MONOCYTES # BLD AUTO: 0.33 10*3/MM3 (ref 0.1–0.9)
MONOCYTES NFR BLD AUTO: 7.4 % (ref 5–12)
NEUTROPHILS # BLD AUTO: 3.3 10*3/MM3 (ref 1.7–7)
NEUTROPHILS NFR BLD AUTO: 74.3 % (ref 42.7–76)
NRBC BLD AUTO-RTO: 0 /100 WBC (ref 0–0.2)
PLATELET # BLD AUTO: 200 10*3/MM3 (ref 140–450)
PMV BLD AUTO: 9.6 FL (ref 6–12)
RBC # BLD AUTO: 2.88 10*6/MM3 (ref 4.14–5.8)
WBC NRBC COR # BLD: 4.45 10*3/MM3 (ref 3.4–10.8)

## 2020-01-24 PROCEDURE — 97535 SELF CARE MNGMENT TRAINING: CPT

## 2020-01-24 PROCEDURE — 97110 THERAPEUTIC EXERCISES: CPT

## 2020-01-24 PROCEDURE — 85025 COMPLETE CBC W/AUTO DIFF WBC: CPT | Performed by: INTERNAL MEDICINE

## 2020-01-24 RX ORDER — FUROSEMIDE 40 MG/1
40 TABLET ORAL ONCE
Status: COMPLETED | OUTPATIENT
Start: 2020-01-24 | End: 2020-01-24

## 2020-01-24 RX ADMIN — AMLODIPINE BESYLATE 5 MG: 5 TABLET ORAL at 10:00

## 2020-01-24 RX ADMIN — PANTOPRAZOLE SODIUM 40 MG: 40 TABLET, DELAYED RELEASE ORAL at 17:42

## 2020-01-24 RX ADMIN — PANTOPRAZOLE SODIUM 40 MG: 40 TABLET, DELAYED RELEASE ORAL at 10:00

## 2020-01-24 RX ADMIN — WHITE PETROLATUM 41 % TOPICAL OINTMENT: OINTMENT at 10:02

## 2020-01-24 RX ADMIN — METOPROLOL SUCCINATE 25 MG: 25 TABLET, EXTENDED RELEASE ORAL at 09:59

## 2020-01-24 RX ADMIN — SIMVASTATIN 40 MG: 40 TABLET, FILM COATED ORAL at 22:03

## 2020-01-24 RX ADMIN — ZOLPIDEM TARTRATE 5 MG: 5 TABLET ORAL at 23:36

## 2020-01-24 RX ADMIN — RAMIPRIL 10 MG: 2.5 CAPSULE ORAL at 09:59

## 2020-01-24 RX ADMIN — WHITE PETROLATUM 41 % TOPICAL OINTMENT: OINTMENT at 22:02

## 2020-01-24 RX ADMIN — METOPROLOL SUCCINATE 25 MG: 25 TABLET, EXTENDED RELEASE ORAL at 22:03

## 2020-01-24 RX ADMIN — TAMSULOSIN HYDROCHLORIDE 0.4 MG: 0.4 CAPSULE ORAL at 10:00

## 2020-01-24 RX ADMIN — FUROSEMIDE 40 MG: 40 TABLET ORAL at 14:48

## 2020-01-24 NOTE — THERAPY DISCHARGE NOTE
SNF - Physical Therapy Treatment Note/Discharge  ANGELICA Almanzar     Patient Name: Tony Garrett  : 1926  MRN: [id number]  Today's Date: 2020  Onset of Illness/Injury or Date of Surgery: 20  Date of Referral to PT: 20  Referring Physician: Cassius    Admit Date: 2020    Visit Dx:  No diagnosis found.  Patient Active Problem List   Diagnosis   • Abnormal electrocardiogram   • Coronary arteriosclerosis in native artery   • Cardiomyopathy (CMS/HCC)   • Carotid bruit   • Dyspnea on exertion   • Sinus bradycardia   • Fatigue   • Encounter for rehabilitation         Rehab Goal Summary     Row Name 20 0921 20 0810          Bed Mobility Goal 1 (PT)    Activity/Assistive Device (Bed Mobility Goal 1, PT)  bed mobility activities, all  -BP  --     [place] Level/Cues Needed (Bed Mobility Goal 1, PT)  supervision required  -BP  --     Time Frame (Bed Mobility Goal 1, PT)  1 week  -BP  --     Progress/Outcomes (Bed Mobility Goal 1, PT)  goal met  -BP  --        Transfer Goal 1 (PT)    Activity/Assistive Device (Transfer Goal 1, PT)  sit-to-stand/stand-to-sit with appropriate assistive device   -BP  --     [place] Level/Cues Needed (Transfer Goal 1, PT)  supervision required  -BP  --     Time Frame (Transfer Goal 1, PT)  1 week  -BP  --     Progress/Outcome (Transfer Goal 1, PT)  goal met  -BP  --        Gait Training Goal 1 (PT)    Activity/Assistive Device (Gait Training Goal 1, PT)  gait (walking locomotion);walker, rolling  -BP  --     [place] Level (Gait Training Goal 1, PT)  supervision required  -BP  --     Distance (Gait Goal 1, PT)  100  -BP  --     Time Frame (Gait Training Goal 1, PT)  1 week  -BP  --     Progress/Outcome (Gait Training Goal 1, PT)  goal met  -BP  --        Stairs Goal 1 (PT)    Activity/Assistive Device (Stairs Goal 1, PT)  ascending stairs;descending stairs  -BP  --     [place] Level/Cues Needed (Stairs Goal 1, PT)  contact guard assist   -BP  --     Number of Stairs (Stairs Goal 1, PT)  2 with one handrail   -BP  --     Time Frame (Stairs Goal 1, PT)  1 week  -BP  --     Progress/Outcome (Stairs Goal 1, PT)  goal met  -BP  --        Transfer Goal 1 (OT)    Activity/Assistive Device (Transfer Goal 1, OT)  --  toilet;commode, 3-in-1;walker, rolling  -SD (r) GC (t) SD (c)     [place] Level/Cues Needed (Transfer Goal 1, OT)  --  supervision required  -SD (r) GC (t) SD (c)     Time Frame (Transfer Goal 1, OT)  --  1 week  -SD (r) GC (t) SD (c)     Progress/Outcome (Transfer Goal 1, OT)  --  goal met  -SD (r) GC (t) SD (c)        Bathing Goal 1 (OT)    Activity/Assistive Device (Bathing Goal 1, OT)  --  lower body bathing with long handled sponge if needed  -SD (r) GC (t) SD (c)     [place] Level/Cues Needed (Bathing Goal 1, OT)  --  supervision required  -SD (r) GC (t) SD (c)     Time Frame (Bathing Goal 1, OT)  --  1 week  -SD (r) GC (t) SD (c)     Progress/Outcomes (Bathing Goal 1, OT)  --  goal met  -SD (r) GC (t) SD (c)        Dressing Goal 1 (OT)    Activity/Assistive Device (Dressing Goal 1, OT)  --  lower body dressing with long handled ae if needed  -SD (r) GC (t) SD (c)     [place]/Cues Needed (Dressing Goal 1, OT)  --  supervision required  -SD (r) GC (t) SD (c)     Time Frame (Dressing Goal 1, OT)  --  1 week  -SD (r) GC (t) SD (c)     Progress/Outcome (Dressing Goal 1, OT)  --  goal met  -SD (r) GC (t) SD (c)        Balance Goal 1 (OT)    Activity/Assistive Device (Balance Goal 1, OT)  --  standing, dynamic;walker, rolling  -SD (r) GC (t) SD (c)     [place] Level/Cues Needed (Balance Goal 1, OT)  --  supervision required x 5 minutes to increase I with adls  -SD (r) GC (t) SD (c)     Time Frame (Balance Goal 1, OT)  --  1 week  -SD (r) GC (t) SD (c)     Progress/Outcomes (Balance Goal 1, OT)  --  goal met  -SD (r) GC (t) SD (c)       User Key  (r) = Recorded By, (t) = Taken By, (c) = Cosigned By    Initials Name Provider  Type Discipline    SD Fransico Arroyo OTR Occupational Therapist OT    BP Jonnie Clark, PT Physical Therapist PT    Clemente Rocha, OT Student OT Student OT        Therapy Treatment  Rehabilitation Treatment Summary     Row Name 01/24/20 0921 01/24/20 0810          Treatment Time/Intention    Discipline  physical therapist  -BP  occupational therapist  -SD,GC,SD2     Document Type  discharge treatment  -BP  discharge treatment  -SD,GC,SD2     Subjective Information  no complaints  -BP  no complaints  -SD,GC,SD2     Mode of Treatment  physical therapy  -BP  occupational therapy  -SD,GC,SD2     Patient/Family Observations  Patient sitting in bedside chair. Agreeable to PT treatment.   -BP  Pt supine in bed and agreed to therapy  -SD,GC,SD2     Care Plan Review  risks/benefits reviewed;care plan/treatment goals reviewed  -BP  care plan/treatment goals reviewed;risks/benefits reviewed;current/potential barriers reviewed;patient/other agree to care plan  -SD,GC,SD2     Patient Effort  good  -BP  good  -SD,GC,SD2     Existing Precautions/Restrictions  fall  -BP  --     Patient Response to Treatment  Patient has no concerns for return home. Will discharge patient from PT at this time.   -BP  Pt shows significant improvements without having to manage catheter for adls  -SD,GC,SD2     Recorded by [BP] Jonnie Clark, PT 01/24/20 1247 [SD,GC,SD2] Fransico Arroyo OTR (r) Clemente Martin, OT Student (t) Fransico Arroyo, OTR (c) 01/24/20 1116     Row Name 01/24/20 0921 01/24/20 0810          Cognitive Assessment/Intervention- PT/OT    Personal Safety Interventions  gait belt;nonskid shoes/slippers when out of bed  -BP  gait belt;fall prevention program maintained;nonskid shoes/slippers when out of bed  -SD,GC,SD2     Recorded by [BP] Jonnie Clark, PT 01/24/20 1247 [SD,GC,SD2] Fransico Arroyo OTR (r) Clemente Martin, OT Student (t) Fransico Arroyo OTR (c) 01/24/20 1116     Row Name 01/24/20 0921  01/24/20 0810          Safety Issues, Functional Mobility    Comment, Safety Issues/Impairments (Mobility)  Patient demonstrates safe use of AD with mobility.   -BP  Pt requires assistive device when performing functional mobility activities due to balance issues.   -SD,GC,SD2     Recorded by [BP] Jonnie Clark, PT 01/24/20 1247 [SD,GC,SD2] Fransico Arroyo OTR (r) Clemente Martin, OT Student (t) Fransico Arroyo OTR (c) 01/24/20 1116     Row Name 01/24/20 0921 01/24/20 0810          Bed Mobility Assessment/Treatment    Bed Mobility Assessment/Treatment  --  supine-sit  -SD,GC,SD2     Supine-Sit [place] (Bed Mobility)  --  independent  -SD,GC,SD2     Comment (Bed Mobility)  deferred, patient up in chair. Patient reports independence.   -BP  --     Recorded by [BP] Jonnie Clark, PT 01/24/20 1247 [SD,GC,SD2] Fransico Arroyo OTR (r) Clemente Martin, OT Student (t) Fransico Arroyo OTR (c) 01/24/20 1116     Row Name 01/24/20 0810             Functional Mobility    Functional Mobility- Ind. Level  conditional independence  -SD,GC,SD2      Functional Mobility- Device  rolling walker  -SD,GC,SD2      Functional Mobility-Distance (Feet)  25  -SD,GC,SD2      Functional Mobility- Comment  slightly unsteady during dynamic standing activites  -SD,GC,SD2      Recorded by [SD,GC,SD2] Fransico Arroyo OTR (r) Clemente Martin, OT Student (t) Fransico Arroyo OTR (c) 01/24/20 1116      Row Name 01/24/20 0921 01/24/20 0810          Transfer Assessment/Treatment    Transfer Assessment/Treatment  sit-stand transfer;stand-sit transfer  -BP  sit-stand transfer;stand-sit transfer;toilet transfer  -SD,GC,SD2     Comment (Transfers)  Patient does not require cues for hand placement  -BP  independent with use of FWW  -SD,GC,SD2     Recorded by [BP] Jonnie Clark, PT 01/24/20 1247 [SD,GC,SD2] Fransico Arroyo, [place] (r) Clemente Martin OT Student (t) Fransico Arroyo, [place] (c) 01/24/20 1116     Row Name 01/24/20  0921 01/24/20 0810          Sit-Stand Transfer    Sit-Stand [place] (Transfers)  conditional independence  -BP  independent  -SD,GC,SD2     Assistive Device (Sit-Stand Transfers)  walker, front-wheeled  -BP  walker, front-wheeled  -SD,GC,SD2     Recorded by [BP] Jonnie Clark, PT 01/24/20 1247 [SD,GC,SD2] Fransico Arroyo OTR (r) Clemente Martin, OT Student (t) Fransico Arroyo OTR (c) 01/24/20 1116     Row Name 01/24/20 0921 01/24/20 0810          Stand-Sit Transfer    Stand-Sit [place] (Transfers)  conditional independence  -BP  independent  -SD,GC,SD2     Assistive Device (Stand-Sit Transfers)  walker, front-wheeled  -BP  walker, front-wheeled  -SD,GC,SD2     Recorded by [BP] Jonnie Clark, PT 01/24/20 1247 [SD,GC,SD2] Fransico Arroyo OTR (r) Clemente Martin, OT Student (t) Fransico Arroyo OTR (c) 01/24/20 1116     Row Name 01/24/20 0810             Toilet Transfer    Type (Toilet Transfer)  sit-stand;stand-sit  -SD,GC,SD2      [place] Level (Toilet Transfer)  independent  -SD,GC,SD2      Assistive Device (Toilet Transfer)  walker, front-wheeled  -SD,GC,SD2      Recorded by [SD,GC,SD2] Fransico Arroyo OTR (r) Clemente Martin, OT Student (t) Fransico Arroyo OTR (c) 01/24/20 1116      Row Name 01/24/20 0921             Gait/Stairs Assessment/Training    [place] Level (Gait)  conditional independence  -BP      Assistive Device (Gait)  walker, front-wheeled  -BP      Distance in Feet (Gait)  -- greater than 800 feet   -BP      Pattern (Gait)  swing-through  -BP      Left Sided Gait Deviations  heel strike decreased;forward flexed posture  -BP      Comment (Gait/Stairs)  No loss of balance noted. Patient manages directional changes and external obstacles safely. No verbal cues required.   -BP      Recorded by [BP] Jonnie Clark, PT 01/24/20 1247      Row Name 01/24/20 0892             Lower Body Dressing Assessment/Training    Lower Body Dressing [place] Level  lower  body dressing skills;doff;don;pants/bottoms  -SD,GC,SD2      Lower Body Dressing Position  supported sitting;supported standing  -SD,GC,SD2      Comment (Lower Body Dressing)  Pt independent with LB dressing   -SD,GC,SD2      Recorded by [SD,GC,SD2] Fransico Arroyo OTLAURIE (r) Clemente Martin, OT Student (t) Fransico Arroyo OTR (c) 01/24/20 1116      Row Name 01/24/20 0810             Grooming Assessment/Training    [place] Level (Grooming)  grooming skills;hair care, combing/brushing;oral care regimen;wash face, hands;independent  -SD,GC,SD2      Assistive Devices (Grooming)  other (see comments) front wheeled walker  -SD,GC,SD2      Grooming Position  supported standing  -SD,GC,SD2      Recorded by [SD,GC,SD2] Fransico Arroyo OTR (r) Clemente Martin, OT Student (t) Fransico Arroyo OTR (c) 01/24/20 1116      Row Name 01/24/20 0810             Toileting Assessment/Training    [place] Level (Toileting)  independent  -SD,GC,SD2      Assistive Devices (Toileting)  other (see comments) front wheeled walker  -SD,GC,SD2      Toileting Position  supported sitting  -SD,GC,SD2      Comment (Toileting)  Pt independent during toileting  -SD,GC,SD2      Recorded by [SD,GC,SD2] Fransico Arroyo OTR (r) Clemente Martin, OT Student (t) Fransico Arroyo OTR (c) 01/24/20 1116      Row Name 01/24/20 0810             BADL Safety/Performance    Impairments, BADL Safety/Performance  balance  -SD,GC,SD2      Skilled BADL Treatment/Intervention  compensatory training;BADL process/adaptation training;environmental modifications  -SD,GC,SD2      Progress in BADL Status  improvement noted  -SD,GC,SD2      Recorded by [SD,GC,SD2] Fransico Arroyo OTLAURIE (r) Clemente Martin, OT Student (t) Fransico Arroyo OTR (c) 01/24/20 1116      Row Name 01/24/20 0921             Lower Extremity Standing Therapeutic Exercise    Comment, Standing Lower Extremity (Therapeutic Exercise)  Provided patient with written standing HEP.   -BP       Recorded by [BP] Jonnie Clark, PT 01/24/20 1247      Row Name 01/24/20 0810             Dynamic Standing Balance    Level of [place], Reaches Outside Midline (Standing, Dynamic Balance)  supervision  -SD,GC,SD2      Time Able to Maintain Position, Reaches Outside Midline (Standing, Dynamic Balance)  less than 15 seconds  -SD,GC,SD2      Assistive Device Utilized (Supported Standing, Dynamic Balance)  walker, rolling  -SD,GC,SD2      Comment, Reaches Outside Midline (Standing, Dynamic Balance)  Pt is slighty unstable when performing dynamic standing activites   -SD,GC,SD2      Recorded by [SD,GC,SD2] Fransico Arroyo OTR (r) Clemente Martin, OT Student (t) Fransico Arroyo OTR (c) 01/24/20 1116      Row Name 01/24/20 0810             Positioning and Restraints    Pre-Treatment Position  in bed  -SD,GC,SD2      Post Treatment Position  chair  -SD,GC,SD2      In Chair  sitting;call light within reach;encouraged to call for assist  -SD,GC,SD2      Recorded by [SD,GC,SD2] Fransico Arroyo OTR (r) Clemente Martin OT Student (t) Fransico Arroyo OTR (c) 01/24/20 1116      Row Name 01/24/20 0810             Pain Scale: Numbers Pre/Post-Treatment    Pain Scale: Numbers, Pretreatment  0/10 - no pain  -SD,GC,SD2      Pain Scale: Numbers, Post-Treatment  0/10 - no pain  -SD,GC,SD2      Recorded by [SD,GC,SD2] Fransico Arroyo OTR (r) Clemente Martin, OT Student (t) Fransico Arroyo OTR (c) 01/24/20 1116      Row Name                Wound 01/16/20 2000 Right posterior elbow Abrasion    Wound - Properties Group Date first assessed: 01/16/20 [RS] Time first assessed: 2000 [RS] Side: Right [RS] Orientation: posterior [RS] Location: elbow [RS] Primary Wound Type: Abrasion [RS] Recorded by:  [RS] Sindy Henson RN 01/17/20 0346    Row Name 01/24/20 0921 01/24/20 0810          Plan of Care Review    Plan of Care Reviewed With  patient  -BP  patient  -SD,CHERISE,SD2     Progress  --  improving  -CHERISE RASMUSSEN,SD2      Outcome Summary  PT: Patient performs sit to/from stand transfers with conditional independence and gait greater than 800 feet with conditional independence with use of FWW. Patient manages directional changes and external obstacles without need for cues or loss of balance. Provided and reviewed standing written HEP. Patient has no concerns for return home. He has met all functional goals. Recommend continued use of FWW for mobility. No further skilled PT needs at this time.   -BP  OT: Pt is independent with adl tasks now that he does not have to manage the lobo catheter. Pt requires use of a FFW for functional mobility/standing adls due to slight instability when performing activites that require dynamic standing balance. Pt stated he lives with his son for support if needed, and he has no concerns for returning home. Plan is to discontinue OT services and discharge home with assist.  -SD,GC,SD2     Recorded by [BP] Jonnie Clark, PT 01/24/20 1247 [SD,GC,SD2] Fransico Arroyo, OTLAURIE (r) Clemente Martin, OT Student (t) Fransico Arroyo, OTR (c) 01/24/20 1116     Row Name 01/24/20 0810             Outcome Summary/Treatment Plan (OT)    Daily Summary of Progress (OT)  progress toward functional goals as expected  -SD,GC,SD2      Plan for Continued Treatment (OT)  discontinue OT services and discharge home with assist.   -SD,GC,SD2      Recorded by [SD,GC,SD2] Fransico Arroyo OTR (r) Clemente Martin, OT Student (t) Fransico Arroyo, OTR (c) 01/24/20 1116      Row Name 01/24/20 0921             Outcome Summary/Treatment Plan (PT)    Daily Summary of Progress (PT)  prepare for discharge  -BP      Plan for Continued Treatment (PT)  Will discharge patient from PT at this time.   -BP      Anticipated Equipment Needs at Discharge (PT)  bedside commode;standard cane;front wheeled walker  -BP      Anticipated Discharge Disposition (PT)  home with home health  -BP      Patient/Family Concerns, Anticipated Discharge  Disposition (PT)  Recommend home health however patient refuses need for home health.   -BP      Recorded by [BP] Gemini ClarkMonty, PT 01/24/20 5663        User Key  (r) = Recorded By, (t) = Taken By, (c) = Cosigned By    Initials Name Effective Dates Discipline    SD Fransico Arroyo, OTR 06/22/16 -  OT    BP Shaila ClarkIsrrael, PT 04/03/18 -  PT    RS Sindy Henson, RN 04/28/17 -  Nurse    Clemente Rocha, OT Student 01/08/20 -  OT        Wound 01/16/20 2000 Right posterior elbow Abrasion (Active)   Dressing Appearance dry;intact 1/23/2020  9:23 PM       PT Recommendation and Plan  Anticipated Discharge Disposition (PT): home with home health  Planned Therapy Interventions (PT Eval): bed mobility training, gait training, home exercise program, patient/family education, transfer training, stair training, strengthening  Therapy Frequency (PT Clinical Impression): 5 times/wk  Outcome Summary/Treatment Plan (PT)  Daily Summary of Progress (PT): prepare for discharge  Plan for Continued Treatment (PT): Will discharge patient from PT at this time.   Anticipated Equipment Needs at Discharge (PT): bedside commode, standard cane, front wheeled walker  Anticipated Discharge Disposition (PT): home with home health  Patient/Family Concerns, Anticipated Discharge Disposition (PT): Recommend home health however patient refuses need for home health.   Plan of Care Reviewed With: patient  Progress: improving  Outcome Summary: PT: Patient performs sit to/from stand transfers with conditional independence and gait greater than 800 feet with conditional independence with use of FWW. Patient manages directional changes and external obstacles without need for cues or loss of balance. Provided and reviewed standing written HEP. Patient has no concerns for return home. He has met all functional goals. Recommend continued use of FWW for mobility. No further skilled PT needs at this time.          Time Calculation:   PT Charges      Row Name 01/24/20 1252             Time Calculation    Start Time  0921  -BP      Stop Time  0940  -BP      Time Calculation (min)  19 min  -BP      PT Received On  01/24/20  -BP         Timed Charges    79492 - PT Therapeutic Exercise Minutes  19  -BP        User Key  (r) = Recorded By, (t) = Taken By, (c) = Cosigned By    Initials Name Provider Type    BP Jonnie Clark, PT Physical Therapist        Therapy Charges for Today     Code Description Service Date Service Provider Modifiers Qty    17816166653 HC GAIT TRAINING EA 15 MIN 1/23/2020 Jonnie Clark, PT GP 1    09286745933 HC PT THER PROC EA 15 MIN 1/24/2020 Jonnie Clark, PT GP 1               PT Discharge Summary  Anticipated Discharge Disposition (PT): home with home health  Reason for Discharge: All goals achieved, Maximum functional potential achieved  Outcomes Achieved: Able to achieve all goals within established timeline  Discharge Destination: Home  S: Patient reports no pain and no concern for return home.   O: Patient has been seen by PT daily 5x/week x 1 week with an emphasis on improved functional mobility and LE strength. Patient now performs bed mobility with independence, sit to/from stand transfers with conditional independence with FWW and gait greater than 800 feet with conditional independence with use of FWW. Patient ascends/descends 10 stairs with SBA with use of two handrails. Patient is independent with seated, supine and standing LE HEP with use of written handout.   A: Patient has now met all functional goals and demonstrates safe use of assistive device consistently. Patient requires no physical assistance and has no concerns for return home.   P: Will discharge patient from PT at this time. Recommend continued use of FWW for mobility as well home health however patient refuses need for home health PT.  notified of discharge recommendations.   Jonnie Clark, PT  1/24/2020

## 2020-01-24 NOTE — PLAN OF CARE
Problem: Patient Care Overview  Goal: Plan of Care Review  Outcome: Ongoing (interventions implemented as appropriate)  Flowsheets  Taken 1/23/2020 1928 by Russell Wheatley LPN  Progress: improving  Taken 1/23/2020 1050 by Jonnie Clark PT  Plan of Care Reviewed With: patient

## 2020-01-24 NOTE — PLAN OF CARE
Problem: Patient Care Overview  Goal: Plan of Care Review  1/24/2020 0132 by Laverne Dupont LPN  Outcome: Ongoing (interventions implemented as appropriate)  1/24/2020 0132 by Laverne Dupont LPN  Outcome: Ongoing (interventions implemented as appropriate)  Goal: Individualization and Mutuality  1/24/2020 0132 by Laverne Dupont LPN  Outcome: Ongoing (interventions implemented as appropriate)  1/24/2020 0132 by Laverne Dupont LPN  Outcome: Ongoing (interventions implemented as appropriate)  Goal: Discharge Needs Assessment  1/24/2020 0132 by Laverne Dupont LPN  Outcome: Ongoing (interventions implemented as appropriate)  1/24/2020 0132 by Laverne Dupont LPN  Outcome: Ongoing (interventions implemented as appropriate)  Goal: Interprofessional Rounds/Family Conf  1/24/2020 0132 by Laverne Dupont LPN  Outcome: Ongoing (interventions implemented as appropriate)  1/24/2020 0132 by Laverne Dupont LPN  Outcome: Ongoing (interventions implemented as appropriate)

## 2020-01-24 NOTE — PLAN OF CARE
Problem: Patient Care Overview  Goal: Plan of Care Review  Flowsheets  Taken 1/24/2020 1155 by Clemente Martin OT Student  Plan of Care Reviewed With: patient  Taken 1/24/2020 0921 by Jonnie Clark, PT  Outcome Summary: PT: Patient performs sit to/from stand transfers with conditional independence and gait greater than 800 feet with conditional independence with use of FWW. Patient manages directional changes and external obstacles without need for cues or loss of balance. Provided and reviewed standing written HEP. Patient has no concerns for return home. He has met all functional goals. Recommend continued use of FWW for mobility. No further skilled PT needs at this time.

## 2020-01-24 NOTE — PROGRESS NOTES
To be discharged home tomorrow. He does NOT want home health or to do outpatient physical therapy. Follow up with , PCP, on January 28, 2020 at 2:30PM. Follow up with urology,  on January 31, 2020 at 9:15AM at the [facility] office. He is a requesting a rolling walker and a bedside commode in which Frannie will be providing him. No other DME needed.

## 2020-01-24 NOTE — PLAN OF CARE
Problem: Patient Care Overview  Goal: Plan of Care Review  Flowsheets  Taken 1/24/2020 0810  Progress: improving  Outcome Summary: OT: Pt is independent with adl tasks now that he does not have to manage the lobo catheter. Pt requires use of a FFW for functional mobility/standing adls due to slight instability when performing activites that require dynamic standing balance. Pt stated he lives with his son for support if needed, and he has no concerns for returning home. Plan is to discontinue OT services and discharge home with assist.  Taken 1/24/2020 1158  Plan of Care Reviewed With: patient (Pended)

## 2020-01-24 NOTE — PROGRESS NOTES
"Daily Progress Note:      Chief complaint: Follow-up of right ureteric stone status post extraction with postoperative hematuria, atrial fibrillation, hypertension    Subjective: She developed some severe lower abdominal pain and having to have a Lopez catheter placed likely due to obstruction from a clot he has improved and his urine is clearer today than it was yesterday urology has been consulted.  His stool is less dark.     LOS: 8 days     Vital Signs  Temp:  [97.8 °F (36.6 °C)-98 °F (36.7 °C)] 97.8 °F (36.6 °C)  Heart Rate:  [71-72] 72  Resp:  [18] 18  BP: (102-124)/(52-56) 102/52  Oxygen Therapy  SpO2: 98 %  Pulse Oximetry Type: Intermittent  Device (Oxygen Therapy): room air}  Body mass index is 24.99 kg/m².  Flowsheet Rows      First Filed Value   Admission Height  172.7 cm (68\") Documented at 01/16/2020 1656   Admission Weight  75.7 kg (166 lb 12.8 oz) Documented at 01/16/2020 1656                   Documented weights    01/16/20 1656   Weight: 75.7 kg (166 lb 12.8 oz)           Patient Vitals for the past 24 hrs:   BP Temp Temp src Pulse Resp SpO2   01/24/20 0957 102/52 97.8 °F (36.6 °C) Oral 72 18 98 %   01/23/20 2117 124/56 98 °F (36.7 °C) Oral 71 18 99 %       75.7 kg (166 lb 12.8 oz)      Intake/Output Summary (Last 24 hours) at 1/24/2020 1300  Last data filed at 1/24/2020 1006  Gross per 24 hour   Intake 600 ml   Output 1000 ml   Net -400 ml       Review of Systems   Constitutional: Negative for activity change, appetite change and diaphoresis.   HENT: Negative for congestion.    Respiratory: Negative for cough, chest tightness, shortness of breath and wheezing.    Cardiovascular: Negative for chest pain.   Gastrointestinal: Negative for abdominal distention, abdominal pain, diarrhea, nausea and vomiting.   Endocrine: Negative for polyphagia and polyuria.   Genitourinary: Negative for frequency.   Skin: Negative for rash.   Neurological: Negative for light-headedness.   Hematological: Does not " bruise/bleed easily.   Psychiatric/Behavioral: Negative for agitation and behavioral problems.       Physical Exam   Constitutional: He appears well-developed and well-nourished.   HENT:   Head: Normocephalic.   Mouth/Throat: Oropharynx is clear and moist.   Eyes: Conjunctivae are normal.   Neck: Normal range of motion. No JVD present. No thyromegaly present.   Cardiovascular: Normal rate. An irregularly irregular rhythm present.   Murmur heard.  Pulmonary/Chest: Effort normal and breath sounds normal. No respiratory distress. He has no wheezes. He has no rales.   Abdominal: Soft. Bowel sounds are normal. He exhibits no distension. There is no tenderness. There is no guarding.   Neurological: He is alert.   Skin: Skin is warm and dry. No rash noted.   Nursing note and vitals reviewed.      Medication Review:   I have reviewed the patient's current medication list  Scheduled Meds:    amLODIPine 5 mg Oral Daily   AQUAPHOR ADVANCED THERAPY  Topical Q12H   furosemide 40 mg Oral Once   influenza virus vacc split PF 0.5 mL Intramuscular Once   metoprolol succinate XL 25 mg Oral BID   pantoprazole 40 mg Oral BID AC   ramipril 10 mg Oral Daily   simvastatin 40 mg Oral Daily   tamsulosin 0.4 mg Oral Daily   zolpidem 5 mg Oral Nightly     Continuous Infusions:   PRN Meds:.•  acetaminophen  •  bisacodyl  •  docusate sodium  •  magnesium hydroxide  •  ondansetron **OR** ondansetron  •  sennosides-docusate      Labs:  Results from last 7 days   Lab Units 01/24/20  0747 01/23/20  0521 01/22/20  0552 01/20/20  0528 01/19/20  0809 01/18/20  2311   WBC 10*3/mm3 4.45 5.49 5.30 6.02 6.26 6.64   HEMOGLOBIN g/dL 8.6* 8.1* 8.4* 8.5* 8.7* 8.9*   HEMATOCRIT % 27.3* 25.8* 26.3* 26.2* 27.8* 28.4*   PLATELETS 10*3/mm3 200 194 182 168 179 177     Results from last 7 days   Lab Units 01/20/20  0528 01/18/20  2311   SODIUM mmol/L 138 137   POTASSIUM mmol/L 4.1 4.3   CHLORIDE mmol/L 106 104   CO2 mmol/L 24.5 23.5   BUN mg/dL 20 23   CREATININE  mg/dL 0.94 0.96   CALCIUM mg/dL 9.0 8.6   GLUCOSE mg/dL 94 99           Lab Results (last 24 hours)     Procedure Component Value Units Date/Time    CBC & Differential [[id number]] Collected:  01/24/20 0747    Specimen:  Blood Updated:  01/24/20 0756    Narrative:       The following orders were created for panel order CBC & Differential.  Procedure                               Abnormality         Status                     ---------                               -----------         ------                     CBC Auto Differential[[id number]]        Abnormal            Final result                 Please view results for these tests on the individual orders.    CBC Auto Differential [[id number]]  (Abnormal) Collected:  01/24/20 0747    Specimen:  Blood Updated:  01/24/20 0756     WBC 4.45 10*3/mm3      RBC 2.88 10*6/mm3      Hemoglobin 8.6 g/dL      Hematocrit 27.3 %      MCV 94.8 fL      MCH 29.9 pg      MCHC 31.5 g/dL      RDW 13.4 %      RDW-SD 45.8 fl      MPV 9.6 fL      Platelets 200 10*3/mm3      Neutrophil % 74.3 %      Lymphocyte % 14.6 %      Monocyte % 7.4 %      Eosinophil % 3.1 %      Basophil % 0.4 %      Immature Grans % 0.2 %      Neutrophils, Absolute 3.30 10*3/mm3      Lymphocytes, Absolute 0.65 10*3/mm3      Monocytes, Absolute 0.33 10*3/mm3      Eosinophils, Absolute 0.14 10*3/mm3      Basophils, Absolute 0.02 10*3/mm3      Immature Grans, Absolute 0.01 10*3/mm3      nRBC 0.0 /100 WBC                                   Invalid input(s): LDLCALC          No results found for: POCGLU          Results from last 7 days   Lab Units 01/20/20  1356   NITRITE UA  Negative   WBC UA /HPF 3-5*   BACTERIA UA /HPF Trace*   SQUAM EPITHEL UA /HPF 0-2             Radiology:  Imaging Results (Last 24 Hours)     ** No results found for the last 24 hours. **          Cardiology:  ECG/EMG Results (last 24 hours)     ** No results found for the last 24 hours. **          I have reviewed recent labs results and consult  notes.  Parts of this note may have been copied and pasted but patient was examined and interviewed by me today    Assessment and Plan:          1.  Generalized weakness from prolonged recent hospital stay continue with therapies    2.  Permanent atrial fibrillation rate controlled nothing acute continue with home beta-blocker.  Patient was on chronic anticoagulation with Eliquis to be held because of his significant hematuria will be resumed once this is improved     3.  Hypertension well controlled continue home medications     4.  Hyperlipidemia nothing acute we will continue with present statin        5.  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  Recent right ureteric stone status post stone extraction maturing has resolved catheter is been discontinued is voiding without problems    7.  Melena with mild anemia GI has been consulted patient defers any further work-up at this time we will continue to monitor his H&H's     Much of this encounter note is an electronic transcription/translation of spoken language to printed text using Dragon Software

## 2020-01-24 NOTE — THERAPY DISCHARGE NOTE
SNF - Occupational Therapy Treatment Note/Discharge  ANGELICA Shawnee Pleitez     Patient Name: Tony Garrett  : 1926  MRN: [id number]  Today's Date: 2020  Onset of Illness/Injury or Date of Surgery: 20  Date of Referral to OT: 20  Referring Physician: Cassius      Admit Date: 2020    Visit Dx:   No diagnosis found.  Patient Active Problem List   Diagnosis   • Abnormal electrocardiogram   • Coronary arteriosclerosis in native artery   • Cardiomyopathy (CMS/HCC)   • Carotid bruit   • Dyspnea on exertion   • Sinus bradycardia   • Fatigue   • Encounter for rehabilitation       Therapy Treatment  S: Pt states now that the lobo catheter is removed, he is independent in adl/functional mobility tasks. Pt reports that he has support at home if needed, and has no concerns returning home.  O: Pt seen by OT 5x a week for 1 week to address adl retraining, AE education, compensatory strategies, and functional transfers/ mobility. Pt provided with education regarding compensatory strategies/AE for LB dressing, initially due to L knee pain/stiffness and difficulty managing catheter. Initially pt required min A for LB adls and functional transfers overall due to pain, weakness, and balance issues.   A: Pt has increased independence in adls now that he does not have to manage the lobo catheter. Pt requires us of FWW for functional mobility/standing adls due to slight instability when performing activites that require dynamic standing balance. Pt stated he has support at home, and has no concerns for returning home.  P: Discontinue OT services and discharge pt home. 3 in 1 commode recommended for toileting transfers/tasks.        Wound 20 Right posterior elbow Abrasion (Active)   Dressing Appearance dry;intact 2020  9:23 PM         OT IRF GOALS     Row Name 20 0810 20 0901          Balance Goal 1 (OT)    Activity/Assistive Device (Balance Goal 1, OT)  standing, dynamic;walker,  rolling  -SD (r) GC (t) SD (c)  standing, dynamic;walker, rolling  -JJ     Dana Level/Cues Needed (Balance Goal 1, OT)  supervision required x 5 minutes to increase I with adls  -SD (r) GC (t) SD (c)  supervision required x 5 minutes to increase I with adls  -JJ     Time Frame (Balance Goal 1, OT)  1 week  -SD (r) GC (t) SD (c)  1 week  -JJ     Progress/Outcomes (Balance Goal 1, OT)  goal met  -SD (r) GC (t) SD (c)  -- new goal  -JJ       User Key  (r) = Recorded By, (t) = Taken By, (c) = Cosigned By    Initials Name Provider Type    SD Fransico Arroyo, OTR Occupational Therapist    Lisbet Lynn OTR Occupational Therapist    Clemente Rocha, OT Student OT Student                OT Recommendation and Plan  Anticipated Discharge Disposition (OT): home with assist  Daily Summary of Progress (OT): progress toward functional goals as expected  Plan for Continued Treatment (OT): discontinue OT services and discharge home with assist.   Plan of Care Review  Plan of Care Reviewed With: patient  Plan of Care Reviewed With: patient         Time Calculation:    Time Calculation- OT     Row Name 01/24/20 1010             Time Calculation- OT    OT Start Time  0809  -SD (r) GC (t) SD (c)      OT Stop Time  0841  -SD (r) GC (t) SD (c)      OT Time Calculation (min)  32 min  -SD (r) GC (t)      TCU Minutes- OT  32 min  -SD (r) GC (t) SD (c)         Timed Charges    26029 - OT Self Care/Mgmt Minutes  32  -SD (r) GC (t) SD (c)        User Key  (r) = Recorded By, (t) = Taken By, (c) = Cosigned By    Initials Name Provider Type    Fransico Govea, OTR Occupational Therapist    Clemente Rocha, OT Student OT Student          Therapy Charges for Today     Code Description Service Date Service Provider Modifiers Qty    42436648520 HC OT SELF CARE/MGMT/TRAIN EA 15 MIN 1/23/2020 Clemente Martin, OT Student GO 4    80634186513 HC OT SELF CARE/MGMT/TRAIN EA 15 MIN 1/24/2020 Mario, Gesta, OT Student GO 2                OT Discharge Summary  Anticipated Discharge Disposition (OT): home with assist  Reason for Discharge: All goals achieved  Outcomes Achieved: Able to achieve all goals within established timeline  Discharge Destination: Home with assist    Clemente Martin OT Student  1/24/2020

## 2020-01-25 VITALS
SYSTOLIC BLOOD PRESSURE: 119 MMHG | OXYGEN SATURATION: 98 % | HEART RATE: 76 BPM | HEIGHT: 69 IN | BODY MASS INDEX: 24.71 KG/M2 | WEIGHT: 166.8 LBS | DIASTOLIC BLOOD PRESSURE: 54 MMHG | TEMPERATURE: 97.8 F | RESPIRATION RATE: 18 BRPM

## 2020-01-25 PROBLEM — N20.0 RENAL LITHIASIS: Status: ACTIVE | Noted: 2020-01-25

## 2020-01-25 PROBLEM — K92.1 MELENA: Status: ACTIVE | Noted: 2020-01-25

## 2020-01-25 PROBLEM — D62 ACUTE BLOOD LOSS ANEMIA: Status: ACTIVE | Noted: 2020-01-25

## 2020-01-25 PROBLEM — I48.91 ATRIAL FIBRILLATION (HCC): Status: ACTIVE | Noted: 2020-01-25

## 2020-01-25 LAB
ANION GAP SERPL CALCULATED.3IONS-SCNC: 7.7 MMOL/L (ref 5–15)
BUN BLD-MCNC: 16 MG/DL (ref 8–23)
BUN/CREAT SERPL: 14.8 (ref 7–25)
CALCIUM SPEC-SCNC: 8.6 MG/DL (ref 8.2–9.6)
CHLORIDE SERPL-SCNC: 105 MMOL/L (ref 98–107)
CO2 SERPL-SCNC: 26.3 MMOL/L (ref 22–29)
CREAT BLD-MCNC: 1.08 MG/DL (ref 0.76–1.27)
DEPRECATED RDW RBC AUTO: 46.9 FL (ref 37–54)
ERYTHROCYTE [DISTWIDTH] IN BLOOD BY AUTOMATED COUNT: 13.7 % (ref 12.3–15.4)
GFR SERPL CREATININE-BSD FRML MDRD: 64 ML/MIN/1.73
GLUCOSE BLD-MCNC: 91 MG/DL (ref 65–99)
HCT VFR BLD AUTO: 25.9 % (ref 37.5–51)
HGB BLD-MCNC: 8.2 G/DL (ref 13–17.7)
IRON 24H UR-MRATE: 42 MCG/DL (ref 59–158)
IRON SATN MFR SERPL: 24 % (ref 20–50)
MCH RBC QN AUTO: 29.7 PG (ref 26.6–33)
MCHC RBC AUTO-ENTMCNC: 31.7 G/DL (ref 31.5–35.7)
MCV RBC AUTO: 93.8 FL (ref 79–97)
PLATELET # BLD AUTO: 194 10*3/MM3 (ref 140–450)
PMV BLD AUTO: 9.8 FL (ref 6–12)
POTASSIUM BLD-SCNC: 4.1 MMOL/L (ref 3.5–5.2)
RBC # BLD AUTO: 2.76 10*6/MM3 (ref 4.14–5.8)
SODIUM BLD-SCNC: 139 MMOL/L (ref 136–145)
TIBC SERPL-MCNC: 178 MCG/DL (ref 298–536)
UIBC SERPL-MCNC: 136 MCG/DL (ref 112–346)
WBC NRBC COR # BLD: 4.21 10*3/MM3 (ref 3.4–10.8)

## 2020-01-25 PROCEDURE — 83540 ASSAY OF IRON: CPT | Performed by: FAMILY MEDICINE

## 2020-01-25 PROCEDURE — 80048 BASIC METABOLIC PNL TOTAL CA: CPT | Performed by: FAMILY MEDICINE

## 2020-01-25 PROCEDURE — 85027 COMPLETE CBC AUTOMATED: CPT | Performed by: FAMILY MEDICINE

## 2020-01-25 PROCEDURE — 83550 IRON BINDING TEST: CPT | Performed by: FAMILY MEDICINE

## 2020-01-25 RX ORDER — AMOXICILLIN 250 MG
2 CAPSULE ORAL 2 TIMES DAILY PRN
Qty: 60 TABLET | Refills: 0 | Status: SHIPPED | OUTPATIENT
Start: 2020-01-25

## 2020-01-25 RX ORDER — ZOLPIDEM TARTRATE 10 MG/1
5 TABLET ORAL NIGHTLY PRN
Start: 2020-01-25

## 2020-01-25 RX ORDER — TAMSULOSIN HYDROCHLORIDE 0.4 MG/1
0.4 CAPSULE ORAL DAILY
Qty: 30 CAPSULE | Refills: 0 | Status: SHIPPED | OUTPATIENT
Start: 2020-01-26

## 2020-01-25 RX ORDER — PANTOPRAZOLE SODIUM 40 MG/1
40 TABLET, DELAYED RELEASE ORAL
Qty: 60 TABLET | Refills: 2 | Status: SHIPPED | OUTPATIENT
Start: 2020-01-25

## 2020-01-25 RX ADMIN — PANTOPRAZOLE SODIUM 40 MG: 40 TABLET, DELAYED RELEASE ORAL at 09:09

## 2020-01-25 RX ADMIN — METOPROLOL SUCCINATE 25 MG: 25 TABLET, EXTENDED RELEASE ORAL at 09:09

## 2020-01-25 RX ADMIN — RAMIPRIL 10 MG: 2.5 CAPSULE ORAL at 09:09

## 2020-01-25 RX ADMIN — AMLODIPINE BESYLATE 5 MG: 5 TABLET ORAL at 09:09

## 2020-01-25 RX ADMIN — WHITE PETROLATUM 41 % TOPICAL OINTMENT: OINTMENT at 09:11

## 2020-01-25 RX ADMIN — TAMSULOSIN HYDROCHLORIDE 0.4 MG: 0.4 CAPSULE ORAL at 09:09

## 2020-01-25 NOTE — PLAN OF CARE
Problem: Patient Care Overview  Goal: Plan of Care Review  Outcome: Ongoing (interventions implemented as appropriate)  Flowsheets (Taken 1/25/2020 0420)  Progress: improving  Plan of Care Reviewed With: patient     Problem: Functional Deficit (Adult,Obstetrics,Pediatric)  Goal: Signs and Symptoms of Listed Potential Problems Will be Absent, Minimized or Managed (Functional Deficit)  Outcome: Ongoing (interventions implemented as appropriate)     Problem: Fall Risk (Adult)  Goal: Absence of Fall  Outcome: Ongoing (interventions implemented as appropriate)     Problem: Skin Injury Risk (Adult)  Goal: Skin Health and Integrity  Outcome: Ongoing (interventions implemented as appropriate)     Problem: Pain, Acute (Adult)  Goal: Acceptable Pain Control/Comfort Level  Outcome: Ongoing (interventions implemented as appropriate)

## 2020-01-25 NOTE — DISCHARGE SUMMARY
Tony Garrett  9/21/1926  [id number]        Discharge Summary    Date of Admission: 1/16/2020  Date of Discharge:  1/25/2020    Primary Discharge Diagnoses:    Generalized weakness secondary to recent hospital admission  Right renal lithiasis status post stone extraction with significant postop bleeding  Melena likely due to a stress ulcer resolved  Acute blood loss anemia secondary to 2 and 3      Secondary Discharge Diagnoses:   Chronic atrial fibrillation with controlled ventricular response currently off anticoagulation due to recent bleeding  Ischemic cardiomyopathy  Coronary disease status post remote MI  Hypertension  Chronic insomnia    PCP  Patient Care Team:  Oralia Hammonds MD as PCP - General  Oralia Hammonds MD as PCP - Family Medicine    Consults:   Consults     Date and Time Order Name Status Description    1/20/2020 0938 Inpatient Urology Consult Completed     1/19/2020 1158 Inpatient Gastroenterology Consult              History of Present Illness:  89-year-old white male well-known to me who presented to [facility] emergency room with severe right flank pain.  Patient was found to have a 1.8 mm distal ureteric stone at the UVJ associated with moderate hydroureteronephrosis patient underwent a stone extraction and stent insertion on 1 9.  Patient developed significant hematuria with clots requiring irrigation and a prolonged post operative state.  He had some blood loss anemia with hemoglobin went down to 9.8 but then remained stable he is being transferred here for generalized weakness and for rehabilitation    Hospital Course  He was admitted to the skilled care unit at [facility] and physical therapy occupational therapy saw patient consultation.  Patient developed some melena which did test positive for blood.  His hemoglobin did drop from admission.  Likely had a stress ulcer from his recent hospitalization and was started on a PPI.  GI saw the patient  consultation and patient did not wish any further investigation unless the bleeding did not stop.  His melena gradually improved and resolved and his hemoglobin remained stable.    Patient did develop an episode of severe lower abdominal pain and urinary retention secondary to recurrent clot post stone extraction.  He required a Lopez catheter to be placed and his urine cleared and had no further hematuria the catheter was removed and is voiding without any problem time of discharge.    Patient has been instructed to resume his Eliquis in 1 week after discharge provided he has no further bleeding all medicines are same as he came in on      Operations and Procedures Performed:       No results found.    Labs:  Results from last 7 days   Lab Units 01/25/20  0601 01/24/20  0747 01/23/20  0521 01/22/20  0552 01/20/20  0528 01/19/20  0809 01/18/20  2311   WBC 10*3/mm3 4.21 4.45 5.49 5.30 6.02 6.26 6.64   HEMOGLOBIN g/dL 8.2* 8.6* 8.1* 8.4* 8.5* 8.7* 8.9*   HEMATOCRIT % 25.9* 27.3* 25.8* 26.3* 26.2* 27.8* 28.4*   PLATELETS 10*3/mm3 194 200 194 182 168 179 177     Results from last 7 days   Lab Units 01/25/20  0601 01/20/20  0528 01/18/20  2311   SODIUM mmol/L 139 138 137   POTASSIUM mmol/L 4.1 4.1 4.3   CHLORIDE mmol/L 105 106 104   CO2 mmol/L 26.3 24.5 23.5   BUN mg/dL 16 20 23   CREATININE mg/dL 1.08 0.94 0.96   CALCIUM mg/dL 8.6 9.0 8.6   GLUCOSE mg/dL 91 94 99           Lab Results (last 24 hours)     Procedure Component Value Units Date/Time    Basic Metabolic Panel [[id number]]  (Normal) Collected:  01/25/20 0601    Specimen:  Blood Updated:  01/25/20 0637     Glucose 91 mg/dL      BUN 16 mg/dL      Creatinine 1.08 mg/dL      Sodium 139 mmol/L      Potassium 4.1 mmol/L      Chloride 105 mmol/L      CO2 26.3 mmol/L      Calcium 8.6 mg/dL      eGFR Non African Amer 64 mL/min/1.73      BUN/Creatinine Ratio 14.8     Anion Gap 7.7 mmol/L     Narrative:       GFR Normal >60  Chronic Kidney Disease <60  Kidney Failure  <15      Iron Profile [[id number]]  (Abnormal) Collected:  01/25/20 0601    Specimen:  Blood Updated:  01/25/20 0637     Iron 42 mcg/dL      Iron Saturation 24 %      UIBC 136 mcg/dL      TIBC 178 mcg/dL     CBC (No Diff) [[id number]]  (Abnormal) Collected:  01/25/20 0601    Specimen:  Blood Updated:  01/25/20 0617     WBC 4.21 10*3/mm3      RBC 2.76 10*6/mm3      Hemoglobin 8.2 g/dL      Hematocrit 25.9 %      MCV 93.8 fL      MCH 29.7 pg      MCHC 31.7 g/dL      RDW 13.7 %      RDW-SD 46.9 fl      MPV 9.8 fL      Platelets 194 10*3/mm3                                   Invalid input(s): LDLCALC          No results found for: POCGLU          Results from last 7 days   Lab Units 01/20/20  1356   NITRITE UA  Negative   WBC UA /HPF 3-5*   BACTERIA UA /HPF Trace*   SQUAM EPITHEL UA /HPF 0-2             Radiology:  Imaging Results (Last 24 Hours)     ** No results found for the last 24 hours. **          PROCEDURES          Allergies:  has No Known Allergies.    Cesra  not reviewed    Discharge Medications:     Your medication list      START taking these medications      Instructions Last Dose Given Next Dose Due   pantoprazole 40 MG EC tablet  Commonly known as:  PROTONIX      Take 1 tablet by mouth 2 (Two) Times a Day Before Meals.       sennosides-docusate 8.6-50 MG per tablet  Commonly known as:  PERICOLACE      Take 2 tablets by mouth 2 (Two) Times a Day As Needed for Constipation. Indications: Constipation       tamsulosin 0.4 MG capsule 24 hr capsule  Commonly known as:  FLOMAX  Start taking on:  January 26, 2020      Take 1 capsule by mouth Daily.          CHANGE how you take these medications      Instructions Last Dose Given Next Dose Due   zolpidem 10 MG tablet  Commonly known as:  AMBIEN  What changed:    · how much to take  · how to take this  · when to take this  · reasons to take this      Take 0.5 tablets by mouth At Night As Needed for Sleep.          CONTINUE taking these medications      Instructions  Last Dose Given Next Dose Due   amLODIPine 5 MG tablet  Commonly known as:  NORVASC      TAKE 1 TABLET DAILY       metoprolol succinate XL 25 MG 24 hr tablet  Commonly known as:  TOPROL-XL      Take 25 mg by mouth 2 (Two) Times a Day.       ramipril 10 MG capsule  Commonly known as:  ALTACE      TAKE 1 CAPSULE DAILY       simvastatin 40 MG tablet  Commonly known as:  ZOCOR      TAKE 1 TABLET AT BEDTIME             Where to Get Your Medications      These medications were sent to [facility] - [place], [place] - [address] [place] [address] - [phone] [facility] [phone]   [address] [place] [address]    Phone:  [phone]   · pantoprazole 40 MG EC tablet  · sennosides-docusate 8.6-50 MG per tablet  · tamsulosin 0.4 MG capsule 24 hr capsule     Information about where to get these medications is not yet available    Ask your nurse or doctor about these medications  · zolpidem 10 MG tablet           Condition on Discharge: Stable    Discharge Disposition  Home    Visiting Nurse:    No     Home PT/OT:  No     Home Safety Evaluation:  No     DME  None    Discharge Diet:      Dietary Orders (From admission, onward)     Start     Ordered    01/20/20 1516  Diet Regular  Diet Effective Now     Question:  Diet Texture / Consistency  Answer:  Regular    01/20/20 1515                Activity at Discharge:  As tolerated      Follow-up Appointments:  Dr. Hammonds in 1 week and Dr. Bustamante in 2 to 3 weeks    Test Results Pending at Discharge       Oralia Hammonds MD  01/25/20  1:09 PM    Much of this encounter note is an electronic transcription/translation of spoken language to printed text using Dragon Software

## 2020-01-27 ENCOUNTER — TRANSCRIBE ORDERS (OUTPATIENT)
Dept: ADMINISTRATIVE | Facility: HOSPITAL | Age: 85
End: 2020-01-27

## 2020-01-27 DIAGNOSIS — R33.8 ENLARGED PROSTATE WITH URINARY RETENTION: Primary | ICD-10-CM

## 2020-01-27 DIAGNOSIS — N40.1 ENLARGED PROSTATE WITH URINARY RETENTION: Primary | ICD-10-CM

## 2020-02-05 ENCOUNTER — HOSPITAL ENCOUNTER (OUTPATIENT)
Dept: ULTRASOUND IMAGING | Facility: HOSPITAL | Age: 85
Discharge: HOME | End: 2020-02-05
Admitting: UROLOGY

## 2020-02-05 DIAGNOSIS — N40.1 ENLARGED PROSTATE WITH URINARY RETENTION: ICD-10-CM

## 2020-02-05 DIAGNOSIS — R33.8 ENLARGED PROSTATE WITH URINARY RETENTION: ICD-10-CM

## 2020-02-05 PROCEDURE — 76775 US EXAM ABDO BACK WALL LIM: CPT

## 2022-10-28 ENCOUNTER — APPOINTMENT (OUTPATIENT)
Dept: GENERAL RADIOLOGY | Facility: HOSPITAL | Age: 87
End: 2022-10-28

## 2022-10-28 ENCOUNTER — HOSPITAL ENCOUNTER (EMERGENCY)
Facility: HOSPITAL | Age: 87
Discharge: HOME | End: 2022-10-28
Attending: EMERGENCY MEDICINE | Admitting: EMERGENCY MEDICINE

## 2022-10-28 VITALS
WEIGHT: 166 LBS | HEIGHT: 70 IN | TEMPERATURE: 98.4 F | SYSTOLIC BLOOD PRESSURE: 111 MMHG | HEART RATE: 70 BPM | BODY MASS INDEX: 23.77 KG/M2 | DIASTOLIC BLOOD PRESSURE: 84 MMHG | RESPIRATION RATE: 17 BRPM | OXYGEN SATURATION: 96 %

## 2022-10-28 DIAGNOSIS — U07.1 COVID: Primary | ICD-10-CM

## 2022-10-28 LAB
ALBUMIN SERPL-MCNC: 3.6 G/DL (ref 3.5–5.2)
ALBUMIN/GLOB SERPL: 1.6 G/DL
ALP SERPL-CCNC: 49 U/L (ref 39–117)
ALT SERPL W P-5'-P-CCNC: 82 U/L (ref 1–41)
ANION GAP SERPL CALCULATED.3IONS-SCNC: 13.5 MMOL/L (ref 5–15)
AST SERPL-CCNC: 405 U/L (ref 1–40)
BACTERIA UR QL AUTO: ABNORMAL /HPF
BASOPHILS # BLD AUTO: 0.01 10*3/MM3 (ref 0–0.2)
BASOPHILS NFR BLD AUTO: 0.1 % (ref 0–1.5)
BILIRUB SERPL-MCNC: 0.6 MG/DL (ref 0–1.2)
BILIRUB UR QL STRIP: NEGATIVE
BUN SERPL-MCNC: 35 MG/DL (ref 8–23)
BUN/CREAT SERPL: 23 (ref 7–25)
CALCIUM SPEC-SCNC: 8.9 MG/DL (ref 8.2–9.6)
CHLORIDE SERPL-SCNC: 103 MMOL/L (ref 98–107)
CLARITY UR: ABNORMAL
CO2 SERPL-SCNC: 19.5 MMOL/L (ref 22–29)
COLOR UR: ABNORMAL
CREAT SERPL-MCNC: 1.52 MG/DL (ref 0.76–1.27)
DEPRECATED RDW RBC AUTO: 48.5 FL (ref 37–54)
EGFRCR SERPLBLD CKD-EPI 2021: 41.7 ML/MIN/1.73
EOSINOPHIL # BLD AUTO: 0 10*3/MM3 (ref 0–0.4)
EOSINOPHIL NFR BLD AUTO: 0 % (ref 0.3–6.2)
ERYTHROCYTE [DISTWIDTH] IN BLOOD BY AUTOMATED COUNT: 14 % (ref 12.3–15.4)
GLOBULIN UR ELPH-MCNC: 2.3 GM/DL
GLUCOSE SERPL-MCNC: 119 MG/DL (ref 65–99)
GLUCOSE UR STRIP-MCNC: NEGATIVE MG/DL
HCT VFR BLD AUTO: 27.8 % (ref 37.5–51)
HGB BLD-MCNC: 9.1 G/DL (ref 13–17.7)
HGB UR QL STRIP.AUTO: ABNORMAL
HOLD SPECIMEN: NORMAL
HYALINE CASTS UR QL AUTO: ABNORMAL /LPF
IMM GRANULOCYTES # BLD AUTO: 0.03 10*3/MM3 (ref 0–0.05)
IMM GRANULOCYTES NFR BLD AUTO: 0.4 % (ref 0–0.5)
KETONES UR QL STRIP: NEGATIVE
LEUKOCYTE ESTERASE UR QL STRIP.AUTO: NEGATIVE
LYMPHOCYTES # BLD AUTO: 0.67 10*3/MM3 (ref 0.7–3.1)
LYMPHOCYTES NFR BLD AUTO: 8.8 % (ref 19.6–45.3)
MAGNESIUM SERPL-MCNC: 2.2 MG/DL (ref 1.7–2.3)
MCH RBC QN AUTO: 31.1 PG (ref 26.6–33)
MCHC RBC AUTO-ENTMCNC: 32.7 G/DL (ref 31.5–35.7)
MCV RBC AUTO: 94.9 FL (ref 79–97)
MONOCYTES # BLD AUTO: 0.73 10*3/MM3 (ref 0.1–0.9)
MONOCYTES NFR BLD AUTO: 9.6 % (ref 5–12)
NEUTROPHILS NFR BLD AUTO: 6.16 10*3/MM3 (ref 1.7–7)
NEUTROPHILS NFR BLD AUTO: 81.1 % (ref 42.7–76)
NITRITE UR QL STRIP: NEGATIVE
NRBC BLD AUTO-RTO: 0 /100 WBC (ref 0–0.2)
PH UR STRIP.AUTO: 5.5 [PH] (ref 4.5–8)
PLATELET # BLD AUTO: 128 10*3/MM3 (ref 140–450)
PMV BLD AUTO: 10.5 FL (ref 6–12)
POTASSIUM SERPL-SCNC: 4.4 MMOL/L (ref 3.5–5.2)
PROT SERPL-MCNC: 5.9 G/DL (ref 6–8.5)
PROT UR QL STRIP: ABNORMAL
QT INTERVAL: 574 MS
RBC # BLD AUTO: 2.93 10*6/MM3 (ref 4.14–5.8)
RBC # UR STRIP: ABNORMAL /HPF
REF LAB TEST METHOD: ABNORMAL
SODIUM SERPL-SCNC: 136 MMOL/L (ref 136–145)
SP GR UR STRIP: 1.01 (ref 1–1.03)
SQUAMOUS #/AREA URNS HPF: ABNORMAL /HPF
TROPONIN T SERPL-MCNC: 0.02 NG/ML (ref 0–0.03)
UROBILINOGEN UR QL STRIP: ABNORMAL
WBC # UR STRIP: ABNORMAL /HPF
WBC NRBC COR # BLD: 7.6 10*3/MM3 (ref 3.4–10.8)
WHOLE BLOOD HOLD COAG: NORMAL
WHOLE BLOOD HOLD SPECIMEN: NORMAL

## 2022-10-28 PROCEDURE — 81001 URINALYSIS AUTO W/SCOPE: CPT | Performed by: EMERGENCY MEDICINE

## 2022-10-28 PROCEDURE — 93010 ELECTROCARDIOGRAM REPORT: CPT | Performed by: INTERNAL MEDICINE

## 2022-10-28 PROCEDURE — 85025 COMPLETE CBC W/AUTO DIFF WBC: CPT | Performed by: EMERGENCY MEDICINE

## 2022-10-28 PROCEDURE — 83735 ASSAY OF MAGNESIUM: CPT | Performed by: EMERGENCY MEDICINE

## 2022-10-28 PROCEDURE — 99284 EMERGENCY DEPT VISIT MOD MDM: CPT

## 2022-10-28 PROCEDURE — 93005 ELECTROCARDIOGRAM TRACING: CPT | Performed by: EMERGENCY MEDICINE

## 2022-10-28 PROCEDURE — 71045 X-RAY EXAM CHEST 1 VIEW: CPT

## 2022-10-28 PROCEDURE — 84484 ASSAY OF TROPONIN QUANT: CPT | Performed by: EMERGENCY MEDICINE

## 2022-10-28 PROCEDURE — 80053 COMPREHEN METABOLIC PANEL: CPT | Performed by: EMERGENCY MEDICINE

## 2022-10-28 RX ORDER — SODIUM CHLORIDE 0.9 % (FLUSH) 0.9 %
10 SYRINGE (ML) INJECTION AS NEEDED
Status: DISCONTINUED | OUTPATIENT
Start: 2022-10-28 | End: 2022-10-28 | Stop reason: HOSPADM

## 2022-10-28 RX ORDER — ONDANSETRON 4 MG/1
4 TABLET, FILM COATED ORAL EVERY 8 HOURS PRN
Qty: 21 TABLET | Refills: 0 | Status: SHIPPED | OUTPATIENT
Start: 2022-10-28 | End: 2022-11-04

## 2022-10-28 RX ORDER — METHYLPREDNISOLONE 4 MG/1
TABLET ORAL
Qty: 21 TABLET | Refills: 0 | Status: SHIPPED | OUTPATIENT
Start: 2022-10-28

## 2022-10-28 RX ADMIN — SODIUM CHLORIDE 500 ML: 9 INJECTION, SOLUTION INTRAVENOUS at 16:09

## 2022-10-29 ENCOUNTER — HOSPITAL ENCOUNTER (OUTPATIENT)
Dept: INFUSION THERAPY | Facility: HOSPITAL | Age: 87
Discharge: HOME | End: 2022-10-29
Admitting: FAMILY MEDICINE

## 2022-10-29 VITALS
RESPIRATION RATE: 20 BRPM | DIASTOLIC BLOOD PRESSURE: 75 MMHG | OXYGEN SATURATION: 98 % | HEART RATE: 68 BPM | SYSTOLIC BLOOD PRESSURE: 121 MMHG | TEMPERATURE: 99 F

## 2022-10-29 PROCEDURE — 25010000002 INJECTION, BEBTELOVIMAB, 175 MG: Performed by: FAMILY MEDICINE

## 2022-10-29 PROCEDURE — M0222 HC INJECTION BEBTELOVIMAB: HCPCS | Performed by: FAMILY MEDICINE

## 2022-10-29 RX ORDER — BEBTELOVIMAB 87.5 MG/ML
175 INJECTION, SOLUTION INTRAVENOUS ONCE
Status: COMPLETED | OUTPATIENT
Start: 2022-10-29 | End: 2022-10-29

## 2022-10-29 RX ORDER — DIPHENHYDRAMINE HCL 50 MG
50 CAPSULE ORAL ONCE AS NEEDED
Status: DISCONTINUED | OUTPATIENT
Start: 2022-10-29 | End: 2022-10-31 | Stop reason: HOSPADM

## 2022-10-29 RX ORDER — DIPHENHYDRAMINE HYDROCHLORIDE 50 MG/ML
50 INJECTION INTRAMUSCULAR; INTRAVENOUS ONCE AS NEEDED
Status: DISCONTINUED | OUTPATIENT
Start: 2022-10-29 | End: 2022-10-31 | Stop reason: HOSPADM

## 2022-10-29 RX ORDER — EPINEPHRINE 1 MG/ML
0.3 INJECTION, SOLUTION INTRAMUSCULAR; SUBCUTANEOUS ONCE AS NEEDED
Status: DISCONTINUED | OUTPATIENT
Start: 2022-10-29 | End: 2022-10-31 | Stop reason: HOSPADM

## 2022-10-29 RX ORDER — METHYLPREDNISOLONE SODIUM SUCCINATE 125 MG/2ML
125 INJECTION, POWDER, LYOPHILIZED, FOR SOLUTION INTRAMUSCULAR; INTRAVENOUS ONCE AS NEEDED
Status: DISCONTINUED | OUTPATIENT
Start: 2022-10-29 | End: 2022-10-31 | Stop reason: HOSPADM

## 2022-10-29 RX ORDER — SODIUM CHLORIDE 9 MG/ML
30 INJECTION, SOLUTION INTRAVENOUS ONCE
Status: DISCONTINUED | OUTPATIENT
Start: 2022-10-29 | End: 2022-10-31 | Stop reason: HOSPADM

## 2022-10-29 RX ADMIN — BEBTELOVIMAB 175 MG: 87.5 INJECTION, SOLUTION INTRAVENOUS at 19:22

## 2022-10-30 NOTE — NURSING NOTE
Pt arrived to [facility] for monoclonial infusion.  EUA given, procedure explained questions answered. Vitals taken, medication given, pt monitored for hour afterwards.  Pt had no complications, AVS given, pt discharged from [facility].

## 2024-12-16 ENCOUNTER — HOSPITAL ENCOUNTER (EMERGENCY)
Facility: HOSPITAL | Age: 89
Discharge: HOME | End: 2024-12-16
Attending: STUDENT IN AN ORGANIZED HEALTH CARE EDUCATION/TRAINING PROGRAM | Admitting: STUDENT IN AN ORGANIZED HEALTH CARE EDUCATION/TRAINING PROGRAM
Payer: MEDICARE

## 2024-12-16 ENCOUNTER — APPOINTMENT (OUTPATIENT)
Dept: GENERAL RADIOLOGY | Facility: HOSPITAL | Age: 89
End: 2024-12-16
Payer: MEDICARE

## 2024-12-16 VITALS
DIASTOLIC BLOOD PRESSURE: 78 MMHG | HEART RATE: 85 BPM | SYSTOLIC BLOOD PRESSURE: 118 MMHG | RESPIRATION RATE: 18 BRPM | HEIGHT: 69 IN | OXYGEN SATURATION: 95 % | WEIGHT: 165 LBS | BODY MASS INDEX: 24.44 KG/M2 | TEMPERATURE: 98.9 F

## 2024-12-16 DIAGNOSIS — U07.1 COVID-19: Primary | ICD-10-CM

## 2024-12-16 LAB
ALBUMIN SERPL-MCNC: 3.8 G/DL (ref 3.5–5.2)
ALBUMIN/GLOB SERPL: 1.5 G/DL
ALP SERPL-CCNC: 54 U/L (ref 39–117)
ALT SERPL W P-5'-P-CCNC: 11 U/L (ref 1–41)
ANION GAP SERPL CALCULATED.3IONS-SCNC: 12.3 MMOL/L (ref 5–15)
AST SERPL-CCNC: 25 U/L (ref 1–40)
BASOPHILS # BLD AUTO: 0.02 10*3/MM3 (ref 0–0.2)
BASOPHILS NFR BLD AUTO: 0.4 % (ref 0–1.5)
BILIRUB SERPL-MCNC: 0.7 MG/DL (ref 0–1.2)
BUN SERPL-MCNC: 19 MG/DL (ref 8–23)
BUN/CREAT SERPL: 15.3 (ref 7–25)
CALCIUM SPEC-SCNC: 9.3 MG/DL (ref 8.2–9.6)
CHLORIDE SERPL-SCNC: 104 MMOL/L (ref 98–107)
CK SERPL-CCNC: 160 U/L (ref 20–200)
CO2 SERPL-SCNC: 21.7 MMOL/L (ref 22–29)
CREAT SERPL-MCNC: 1.24 MG/DL (ref 0.76–1.27)
DEPRECATED RDW RBC AUTO: 46 FL (ref 37–54)
EGFRCR SERPLBLD CKD-EPI 2021: 52.6 ML/MIN/1.73
EOSINOPHIL # BLD AUTO: 0.03 10*3/MM3 (ref 0–0.4)
EOSINOPHIL NFR BLD AUTO: 0.7 % (ref 0.3–6.2)
ERYTHROCYTE [DISTWIDTH] IN BLOOD BY AUTOMATED COUNT: 13.3 % (ref 12.3–15.4)
FLUAV RNA RESP QL NAA+PROBE: NOT DETECTED
FLUBV RNA RESP QL NAA+PROBE: NOT DETECTED
GLOBULIN UR ELPH-MCNC: 2.5 GM/DL
GLUCOSE SERPL-MCNC: 113 MG/DL (ref 65–99)
HCT VFR BLD AUTO: 29.8 % (ref 37.5–51)
HGB BLD-MCNC: 9.8 G/DL (ref 13–17.7)
IMM GRANULOCYTES # BLD AUTO: 0.01 10*3/MM3 (ref 0–0.05)
IMM GRANULOCYTES NFR BLD AUTO: 0.2 % (ref 0–0.5)
INR PPP: 1.49 (ref 0.9–1.1)
LYMPHOCYTES # BLD AUTO: 0.26 10*3/MM3 (ref 0.7–3.1)
LYMPHOCYTES NFR BLD AUTO: 5.8 % (ref 19.6–45.3)
MAGNESIUM SERPL-MCNC: 2.2 MG/DL (ref 1.7–2.3)
MCH RBC QN AUTO: 31 PG (ref 26.6–33)
MCHC RBC AUTO-ENTMCNC: 32.9 G/DL (ref 31.5–35.7)
MCV RBC AUTO: 94.3 FL (ref 79–97)
MONOCYTES # BLD AUTO: 0.51 10*3/MM3 (ref 0.1–0.9)
MONOCYTES NFR BLD AUTO: 11.3 % (ref 5–12)
NEUTROPHILS NFR BLD AUTO: 3.67 10*3/MM3 (ref 1.7–7)
NEUTROPHILS NFR BLD AUTO: 81.6 % (ref 42.7–76)
NRBC BLD AUTO-RTO: 0 /100 WBC (ref 0–0.2)
PLATELET # BLD AUTO: 126 10*3/MM3 (ref 140–450)
PMV BLD AUTO: 9.4 FL (ref 6–12)
POTASSIUM SERPL-SCNC: 4.5 MMOL/L (ref 3.5–5.2)
PROT SERPL-MCNC: 6.3 G/DL (ref 6–8.5)
PROTHROMBIN TIME: 18.5 SECONDS (ref 12.1–15)
QT INTERVAL: 384 MS
QTC INTERVAL: 460 MS
RBC # BLD AUTO: 3.16 10*6/MM3 (ref 4.14–5.8)
RSV RNA RESP QL NAA+PROBE: NOT DETECTED
SARS-COV-2 RNA RESP QL NAA+PROBE: DETECTED
SODIUM SERPL-SCNC: 138 MMOL/L (ref 136–145)
TROPONIN T SERPL HS-MCNC: 34 NG/L
WBC NRBC COR # BLD AUTO: 4.5 10*3/MM3 (ref 3.4–10.8)

## 2024-12-16 PROCEDURE — 85610 PROTHROMBIN TIME: CPT | Performed by: STUDENT IN AN ORGANIZED HEALTH CARE EDUCATION/TRAINING PROGRAM

## 2024-12-16 PROCEDURE — 87637 SARSCOV2&INF A&B&RSV AMP PRB: CPT | Performed by: STUDENT IN AN ORGANIZED HEALTH CARE EDUCATION/TRAINING PROGRAM

## 2024-12-16 PROCEDURE — 83735 ASSAY OF MAGNESIUM: CPT | Performed by: STUDENT IN AN ORGANIZED HEALTH CARE EDUCATION/TRAINING PROGRAM

## 2024-12-16 PROCEDURE — 80053 COMPREHEN METABOLIC PANEL: CPT | Performed by: STUDENT IN AN ORGANIZED HEALTH CARE EDUCATION/TRAINING PROGRAM

## 2024-12-16 PROCEDURE — 71045 X-RAY EXAM CHEST 1 VIEW: CPT

## 2024-12-16 PROCEDURE — 85025 COMPLETE CBC W/AUTO DIFF WBC: CPT | Performed by: STUDENT IN AN ORGANIZED HEALTH CARE EDUCATION/TRAINING PROGRAM

## 2024-12-16 PROCEDURE — 99284 EMERGENCY DEPT VISIT MOD MDM: CPT | Performed by: STUDENT IN AN ORGANIZED HEALTH CARE EDUCATION/TRAINING PROGRAM

## 2024-12-16 PROCEDURE — 82550 ASSAY OF CK (CPK): CPT | Performed by: STUDENT IN AN ORGANIZED HEALTH CARE EDUCATION/TRAINING PROGRAM

## 2024-12-16 PROCEDURE — 84484 ASSAY OF TROPONIN QUANT: CPT | Performed by: STUDENT IN AN ORGANIZED HEALTH CARE EDUCATION/TRAINING PROGRAM

## 2024-12-16 PROCEDURE — 25810000003 LACTATED RINGERS SOLUTION: Performed by: STUDENT IN AN ORGANIZED HEALTH CARE EDUCATION/TRAINING PROGRAM

## 2024-12-16 PROCEDURE — 93005 ELECTROCARDIOGRAM TRACING: CPT | Performed by: STUDENT IN AN ORGANIZED HEALTH CARE EDUCATION/TRAINING PROGRAM

## 2024-12-16 RX ADMIN — SODIUM CHLORIDE, SODIUM LACTATE, POTASSIUM CHLORIDE, CALCIUM CHLORIDE 1000 ML: 20; 30; 600; 310 INJECTION, SOLUTION INTRAVENOUS at 13:38

## 2024-12-16 NOTE — ED PROVIDER NOTES
Subjective   History of Present Illness  89-year-old male with a past medical history of atrial fibrillation presenting with a complaint of a cough as well as fever, chills, states that he believes he has COVID because he was exposed to a friend who was sick.  States that he has no urinary symptoms, he has generalized weakness, no chest pain, he does not feel short of breath, he feels better after sitting upright this morning.  He states that he came in for COVID test        Review of Systems    Past Medical History:   Diagnosis Date    Acute blood loss anemia 1/25/2020    Atrial fibrillation 1/25/2020    Atrial fibrillation, chronic     Chronic kidney disease (CKD), stage III (moderate)     HTN (hypertension)     Hyperlipidemia     Kidney stones     Melena 1/25/2020       No Known Allergies    Past Surgical History:   Procedure Laterality Date    CYSTOSCOPY BLADDER STONE LITHOTRIPSY         History reviewed. No pertinent family history.    Social History     Socioeconomic History    Marital status:    Tobacco Use    Smoking status: Former           Objective   Physical Exam  Vitals and nursing note reviewed. Exam conducted with a chaperone present.   Constitutional:       General: He is not in acute distress.     Appearance: Normal appearance. He is not ill-appearing, toxic-appearing or diaphoretic.   HENT:      Head: Normocephalic and atraumatic.      Nose: Nose normal.      Mouth/Throat:      Mouth: Mucous membranes are moist.      Pharynx: Oropharynx is clear. No oropharyngeal exudate or posterior oropharyngeal erythema.   Eyes:      General: No scleral icterus.        Right eye: No discharge.         Left eye: No discharge.      Extraocular Movements: Extraocular movements intact.      Conjunctiva/sclera: Conjunctivae normal.      Pupils: Pupils are equal, round, and reactive to light.   Cardiovascular:      Rate and Rhythm: Normal rate and regular rhythm.   Pulmonary:      Effort: Pulmonary effort is  normal. No respiratory distress.      Breath sounds: Normal breath sounds. No stridor. No wheezing, rhonchi or rales.   Abdominal:      General: Abdomen is flat. There is no distension.      Palpations: Abdomen is soft. There is no mass.      Tenderness: There is no abdominal tenderness. There is no guarding or rebound.      Hernia: No hernia is present.   Musculoskeletal:         General: No swelling, tenderness, deformity or signs of injury. Normal range of motion.      Cervical back: Normal range of motion. No rigidity or tenderness.      Comments: Mild edema in the bilateral lower extremities   Skin:     General: Skin is warm and dry.      Capillary Refill: Capillary refill takes less than 2 seconds.      Findings: No erythema or rash.      Comments: Age-related degenerative changes   Neurological:      General: No focal deficit present.      Mental Status: He is alert and oriented to person, place, and time. Mental status is at baseline.      Cranial Nerves: No cranial nerve deficit.      Sensory: No sensory deficit.      Motor: No weakness.   Psychiatric:         Mood and Affect: Mood normal.         Procedures           ED Course  ED Course as of 12/16/24 1644   Mon Dec 16, 2024   1090 Patient has received bebtelovimab injection 175 mg, 2 years ago, I see this in his chart, son present at bedside states that this was a miracle for him.  Contacted the PCP to arrange for possible infusion versus discharge.  He takes Eliquis and this interacts with Paxlovid.  Patient otherwise well-appearing.  Son states that he is concerned about his dad's weakness however the son is adamant that he would like to go home because he does not want to be admitted to the hospital he has medical capacity to make his own medical decisions.  Patient lives at home with his son pending PCP response from messages.  [AK]   5628 Reevaluation patient is adamant that he wants to go home, spoke with the PCP, he is not eligible for the  monoclonal antibody infusion, patient is not a candidate for Paxlovid given interaction with Eliquis.  He has good social support at home and encourage the son to begin discussion for assisted living versus home health as he has chronic generalized weakness secondary to being 89 years old and has not difficulty with ambulation  [AK]      ED Course User Index  [AK] David Garrett MD                                                       Medical Decision Making  History and physical exam remarkable for a 89-year-old here with cough, congestion, fever, chills, however his symptoms have largely improved since last night.  He is requesting a COVID test will also get cardiac enzymes, as well as check electrolytes for evaluation of generalized weakness.  Likely secondary to a URI.      Problems Addressed:  COVID-19: complicated acute illness or injury    Amount and/or Complexity of Data Reviewed  Labs: ordered.  Radiology: ordered.  ECG/medicine tests: ordered.        Final diagnoses:   COVID-19       ED Disposition  ED Disposition       ED Disposition   Discharge    Condition   Stable    Comment   --               Oralia Hammonds MD  [address]  [address]  [place] [address] [phone]    In 2 days  For reevaluation of improving symptoms as well as discussion for home health versus assisted living    [facility] EMERGENCY DEPARTMENT  [address]  [place] [address] [phone]  Go to   If symptoms worsen         Medication List      No changes were made to your prescriptions during this visit.            David Garrett MD  12/16/24 7400       David Garrett MD  12/16/24 9923